# Patient Record
Sex: MALE | Race: WHITE | NOT HISPANIC OR LATINO | Employment: UNEMPLOYED | URBAN - METROPOLITAN AREA
[De-identification: names, ages, dates, MRNs, and addresses within clinical notes are randomized per-mention and may not be internally consistent; named-entity substitution may affect disease eponyms.]

---

## 2017-11-21 ENCOUNTER — OFFICE VISIT (OUTPATIENT)
Dept: URGENT CARE | Facility: CLINIC | Age: 32
End: 2017-11-21
Payer: MEDICAID

## 2017-11-21 VITALS
RESPIRATION RATE: 18 BRPM | TEMPERATURE: 98 F | OXYGEN SATURATION: 98 % | HEIGHT: 60 IN | BODY MASS INDEX: 29.45 KG/M2 | DIASTOLIC BLOOD PRESSURE: 78 MMHG | SYSTOLIC BLOOD PRESSURE: 116 MMHG | HEART RATE: 78 BPM | WEIGHT: 150 LBS

## 2017-11-21 DIAGNOSIS — H60.331 ACUTE SWIMMER'S EAR OF RIGHT SIDE: ICD-10-CM

## 2017-11-21 DIAGNOSIS — H66.001 ACUTE SUPPURATIVE OTITIS MEDIA OF RIGHT EAR WITHOUT SPONTANEOUS RUPTURE OF TYMPANIC MEMBRANE, RECURRENCE NOT SPECIFIED: Primary | ICD-10-CM

## 2017-11-21 DIAGNOSIS — L30.9 DERMATITIS: ICD-10-CM

## 2017-11-21 PROCEDURE — 99203 OFFICE O/P NEW LOW 30 MIN: CPT | Mod: S$GLB,,, | Performed by: PHYSICIAN ASSISTANT

## 2017-11-21 RX ORDER — AMOXICILLIN AND CLAVULANATE POTASSIUM 875; 125 MG/1; MG/1
1 TABLET, FILM COATED ORAL 2 TIMES DAILY
Qty: 20 TABLET | Refills: 0 | Status: SHIPPED | OUTPATIENT
Start: 2017-11-21 | End: 2017-12-01

## 2017-11-21 RX ORDER — NEOMYCIN SULFATE, POLYMYXIN B SULFATE, HYDROCORTISONE 3.5; 10000; 1 MG/ML; [USP'U]/ML; MG/ML
3 SOLUTION/ DROPS AURICULAR (OTIC) 3 TIMES DAILY
Qty: 10 ML | Refills: 0 | Status: SHIPPED | OUTPATIENT
Start: 2017-11-21 | End: 2017-12-01

## 2017-11-21 RX ORDER — CODEINE PHOSPHATE AND GUAIFENESIN 10; 100 MG/5ML; MG/5ML
5 SOLUTION ORAL 3 TIMES DAILY PRN
Qty: 120 ML | Refills: 0 | Status: SHIPPED | OUTPATIENT
Start: 2017-11-21 | End: 2017-12-01

## 2017-11-21 RX ORDER — GUAIFENESIN 600 MG/1
1200 TABLET, EXTENDED RELEASE ORAL 2 TIMES DAILY
Qty: 40 TABLET | Refills: 0 | COMMUNITY
Start: 2017-11-21 | End: 2017-11-21 | Stop reason: CLARIF

## 2017-11-21 RX ORDER — TRIAMCINOLONE ACETONIDE 1 MG/G
CREAM TOPICAL 2 TIMES DAILY
Qty: 80 G | Refills: 0 | Status: SHIPPED | OUTPATIENT
Start: 2017-11-21 | End: 2019-01-05

## 2017-11-21 NOTE — PATIENT INSTRUCTIONS
Nonspecific Dermatitis  Dermatitis is a skin rash caused by something that touches the skin and makes it irritated and inflamed.  Your skin may be red, swollen, dry, and may be cracked. Blisters may form and ooze. The rash will itch.  Dermatitis can form on the face and neck, backs of hands, forearms, genitals, and lower legs. Dermatitis is not passed from person to person.  Talk with your health care provider about what may have caused the rash. Common things that cause skin allergies are metal in jewelry, plants like poison ivy or poison oak, and certain skin care products. You will need to avoid the source of your rash in the future to prevent it from coming back. In some cases, the cause of the dermatitis may not be found.  Treatment is done to relieve itching and prevent the rash from coming back. The rash should go away in a few days to a few weeks.  Home care  The health care provider may prescribe medications to relieve swelling and itching. Follow all instructions when using these medications.  · Avoid anything that heats up your skin, such as hot showers or baths, or direct sunlight. This can make itching worse.  · Stay away from whatever you think caused the rash.  · Bathe in warm, not hot, water. Apply a moisturizing lotion after bathing to prevent dry skin.  · Avoid skin irritants such as wool or silk clothing, grease, oils, harsh soaps, and detergents.  · Apply cold compresses to soothe your sores to help relieve your symptoms. Do this for 30 minutes 3 to 4 times a day. You can make a cold compress by soaking a cloth in cold water. Squeeze out excess water. You can add colloidal oatmeal to the water to help reduce itching. For severe itching in a small area, apply an ice pack wrapped in a thin towel. Do this for 20 minutes 3 to 4 times a day.  · You can also help relieve large areas of itching by taking a lukewarm bath with colloidal oatmeal added to the water.  · Use hydrocortisone cream for redness  and irritation, unless another medicine was prescribed. You can also use benzocaine anesthetic cream or spray.  · Use oral diphenhydramine to help reduce itching. This is an antihistamine you can buy at drug and grocery stores. It can make you sleepy, so use lower doses during the daytime. Or you can use loratadine. This is an antihistamine that will not make you sleepy. Dont use diphenhydramine if you have glaucoma or have trouble urinating because of an enlarged prostate.  · Wash your hands or use an antibacterial gel often to prevent the spread of the rash.  Follow-up care  Follow up with your health care provider. Make an appointment with your health care provider if your symptoms do not get better in the next 1 to 2 weeks.  When to seek medical advice  Call your health care provider right away if any of these occur:  · Spreading of the rash to other parts of your body  · Severe swelling of your face, eyelids, mouth, throat or tongue  · Trouble urinating due to swelling in the genital area  · Fever of 100.4°F (38°C) or higher  · Redness or swelling that gets worse  · Pain that gets worse  · Foul-smelling fluid leaking from the skin  · Yellow-brown crusts on the open blisters  · Joint pain   Date Last Reviewed: 7/23/2014  © 9615-5744 [employer]. [address] [place] [address], [place] [address]. All rights reserved. This information is not intended as a substitute for professional medical care. Always follow your healthcare professional's instructions.        Middle Ear Infection (Adult)  You have an infection of the middle ear, the space behind the eardrum. This is also called acute otitis media (AOM). Sometimes it is caused by the common cold. This is because congestion can block the internal passage (eustachian tube) that drains fluid from the middle ear. When the middle ear fills with fluid, bacteria can grow there and cause an infection. Oral antibiotics are used to treat this illness, not ear drops.  Symptoms usually start to improve within 1 to 2 days of treatment.    Home care  The following are general care guidelines:  · Finish all of the antibiotic medicine given, even though you may feel better after the first few days.  · You may use over-the-counter medicine, such as acetaminophen or ibuprofen, to control pain and fever, unless something else was prescribed. If you have chronic liver or kidney disease or have ever had a stomach ulcer or gastrointestinal bleeding, talk with your healthcare provider before using these medicines. Do not give aspirin to anyone under 18 years of age who has a fever. It may cause severe illness or death.  Follow-up care  Follow up with your healthcare provider, or as advised, in 2 weeks if all symptoms have not gotten better, or if hearing doesn't go back to normal within 1 month.  When to seek medical advice  Call your healthcare provider right away if any of these occur:  · Ear pain gets worse or does not improve after 3 days of treatment  · Unusual drowsiness or confusion  · Neck pain, stiff neck, or headache  · Fluid or blood draining from the ear canal  · Fever of 100.4°F (38°C) or as advised   · Seizure  Date Last Reviewed: 6/1/2016  © 8092-4789 [employer]. [address] [place] [address]. All rights reserved. This information is not intended as a substitute for professional medical care. Always follow your healthcare professional's instructions.      Please follow up with your Primary care provider within 2-5 days if your signs and symptoms have not resolved or worsen.     If your condition worsens or fails to improve we recommend that you receive another evaluation at the emergency room immediately or contact your primary medical clinic to discuss your concerns.   You must understand that you have received an Urgent Care treatment only and that you may be released before all of your medical problems are known or treated. You, the patient, will  arrange for follow up care as instructed.

## 2017-11-21 NOTE — PROGRESS NOTES
Subjective:       Patient ID: Kwabena Gallagher IV is a 32 y.o. male.    Vitals:  height is 5' (1.524 m) and weight is 68 kg (150 lb). His tympanic temperature is 97.8 °F (36.6 °C). His blood pressure is 116/78 and his pulse is 78. His respiration is 18 and oxygen saturation is 98%.     Chief Complaint: Otitis Media (bilat) and Rash (left hip )    Present with bilat recurrent ear infections that causes jaw pain and rash on left hip for 1 + year.        Otitis Media:   Chronicity:  Recurrent  Onset:  1 to 4 weeks ago (3-4 weeks )  Progression since onset:  Unchanged  Frequency:  Constantly  Number of Infections:  2 - 5 (2 in 20 years )  Pain scale:  7/10  Severity:  Moderate  Duration:  Very brief  Otitis media characteristics: radiates to jaws    Associated symptoms: ear pain (bilat mostly right ) and rash.  No chills, no fever, no headaches, no congestion, no shortness of breath, no sore throat and no wheezing.  Aggravated by:  Nothing  Treatments tried: excedrine migraine.  Improvement on treatment:  Significant   PMH includes: ear surgery.    Rash   Pertinent negatives include no congestion, cough, fever, joint pain, shortness of breath or sore throat.     Review of Systems   Constitution: Negative for chills, fever and malaise/fatigue.   HENT: Positive for ear pain (bilat mostly right ). Negative for congestion, hoarse voice and sore throat.    Eyes: Negative for discharge and redness.   Cardiovascular: Negative for chest pain, dyspnea on exertion and leg swelling.   Respiratory: Negative for cough, shortness of breath, sputum production and wheezing.    Skin: Positive for color change, itching and rash.   Musculoskeletal: Negative for joint pain and myalgias.   Gastrointestinal: Negative for abdominal pain and nausea.   Neurological: Negative for headaches.       Objective:      Physical Exam   Constitutional: He is oriented to person, place, and time. He appears well-developed and well-nourished. He is  cooperative.  Non-toxic appearance. He does not appear ill. No distress.   HENT:   Head: Normocephalic and atraumatic. Head is without abrasion, without contusion and without laceration.   Right Ear: Hearing, external ear and ear canal normal. There is drainage, swelling and tenderness. Tympanic membrane is injected. A middle ear effusion is present.   Left Ear: Hearing, tympanic membrane, external ear and ear canal normal.   Nose: Nose normal. No mucosal edema, rhinorrhea or nasal deformity. No epistaxis. Right sinus exhibits no maxillary sinus tenderness and no frontal sinus tenderness. Left sinus exhibits no maxillary sinus tenderness and no frontal sinus tenderness.   Mouth/Throat: Uvula is midline, oropharynx is clear and moist and mucous membranes are normal. No trismus in the jaw. Normal dentition. No uvula swelling. No posterior oropharyngeal erythema.   Eyes: Conjunctivae, EOM and lids are normal. Pupils are equal, round, and reactive to light. No scleral icterus.   Sclera clear bilat   Neck: Trachea normal, full passive range of motion without pain and phonation normal. Neck supple.   Cardiovascular: Normal rate, regular rhythm, normal heart sounds, intact distal pulses and normal pulses.    Pulmonary/Chest: Effort normal and breath sounds normal. No stridor. No respiratory distress.   Abdominal: Soft. Normal appearance and bowel sounds are normal. He exhibits no distension. There is no tenderness.   Musculoskeletal: Normal range of motion. He exhibits no edema or deformity.   Neurological: He is alert and oriented to person, place, and time. He exhibits normal muscle tone. Coordination normal.   Skin: Skin is warm, dry and intact. Capillary refill takes less than 2 seconds. Rash noted. No abrasion, no bruising, no burn, no ecchymosis, no laceration, no lesion and no purpura noted. Rash is macular. Rash is not papular, not maculopapular, not nodular, not pustular, not vesicular and not urticarial. He is  not diaphoretic. No erythema. No pallor.        Psychiatric: He has a normal mood and affect. His speech is normal and behavior is normal. Judgment and thought content normal. Cognition and memory are normal.   Nursing note and vitals reviewed.      Assessment:       1. Acute suppurative otitis media of right ear without spontaneous rupture of tympanic membrane, recurrence not specified    2. Acute swimmer's ear of right side    3. Dermatitis        Plan:         Acute suppurative otitis media of right ear without spontaneous rupture of tympanic membrane, recurrence not specified  -     guaiFENesin (MUCINEX) 600 mg 12 hr tablet; Take 2 tablets (1,200 mg total) by mouth 2 (two) times daily.  Dispense: 40 tablet; Refill: 0  -     amoxicillin-clavulanate 875-125mg (AUGMENTIN) 875-125 mg per tablet; Take 1 tablet by mouth 2 (two) times daily.  Dispense: 20 tablet; Refill: 0    Acute swimmer's ear of right side  -     neomycin-polymyxin-hydrocortisone (CORTISPORIN) otic solution; Place 3 drops into the right ear 3 (three) times daily.  Dispense: 10 mL; Refill: 0    Dermatitis  -     triamcinolone acetonide 0.1% (KENALOG) 0.1 % cream; Apply topically 2 (two) times daily.  Dispense: 80 g; Refill: 0        Nonspecific Dermatitis  Dermatitis is a skin rash caused by something that touches the skin and makes it irritated and inflamed.  Your skin may be red, swollen, dry, and may be cracked. Blisters may form and ooze. The rash will itch.  Dermatitis can form on the face and neck, backs of hands, forearms, genitals, and lower legs. Dermatitis is not passed from person to person.  Talk with your health care provider about what may have caused the rash. Common things that cause skin allergies are metal in jewelry, plants like poison ivy or poison oak, and certain skin care products. You will need to avoid the source of your rash in the future to prevent it from coming back. In some cases, the cause of the dermatitis may not be  found.  Treatment is done to relieve itching and prevent the rash from coming back. The rash should go away in a few days to a few weeks.  Home care  The health care provider may prescribe medications to relieve swelling and itching. Follow all instructions when using these medications.  · Avoid anything that heats up your skin, such as hot showers or baths, or direct sunlight. This can make itching worse.  · Stay away from whatever you think caused the rash.  · Bathe in warm, not hot, water. Apply a moisturizing lotion after bathing to prevent dry skin.  · Avoid skin irritants such as wool or silk clothing, grease, oils, harsh soaps, and detergents.  · Apply cold compresses to soothe your sores to help relieve your symptoms. Do this for 30 minutes 3 to 4 times a day. You can make a cold compress by soaking a cloth in cold water. Squeeze out excess water. You can add colloidal oatmeal to the water to help reduce itching. For severe itching in a small area, apply an ice pack wrapped in a thin towel. Do this for 20 minutes 3 to 4 times a day.  · You can also help relieve large areas of itching by taking a lukewarm bath with colloidal oatmeal added to the water.  · Use hydrocortisone cream for redness and irritation, unless another medicine was prescribed. You can also use benzocaine anesthetic cream or spray.  · Use oral diphenhydramine to help reduce itching. This is an antihistamine you can buy at drug and grocery stores. It can make you sleepy, so use lower doses during the daytime. Or you can use loratadine. This is an antihistamine that will not make you sleepy. Dont use diphenhydramine if you have glaucoma or have trouble urinating because of an enlarged prostate.  · Wash your hands or use an antibacterial gel often to prevent the spread of the rash.  Follow-up care  Follow up with your health care provider. Make an appointment with your health care provider if your symptoms do not get better in the next 1 to 2  weeks.  When to seek medical advice  Call your health care provider right away if any of these occur:  · Spreading of the rash to other parts of your body  · Severe swelling of your face, eyelids, mouth, throat or tongue  · Trouble urinating due to swelling in the genital area  · Fever of 100.4°F (38°C) or higher  · Redness or swelling that gets worse  · Pain that gets worse  · Foul-smelling fluid leaking from the skin  · Yellow-brown crusts on the open blisters  · Joint pain   Date Last Reviewed: 7/23/2014 © 2000-2017 [employer]. [address] [place] [address]. All rights reserved. This information is not intended as a substitute for professional medical care. Always follow your healthcare professional's instructions.        Middle Ear Infection (Adult)  You have an infection of the middle ear, the space behind the eardrum. This is also called acute otitis media (AOM). Sometimes it is caused by the common cold. This is because congestion can block the internal passage (eustachian tube) that drains fluid from the middle ear. When the middle ear fills with fluid, bacteria can grow there and cause an infection. Oral antibiotics are used to treat this illness, not ear drops. Symptoms usually start to improve within 1 to 2 days of treatment.    Home care  The following are general care guidelines:  · Finish all of the antibiotic medicine given, even though you may feel better after the first few days.  · You may use over-the-counter medicine, such as acetaminophen or ibuprofen, to control pain and fever, unless something else was prescribed. If you have chronic liver or kidney disease or have ever had a stomach ulcer or gastrointestinal bleeding, talk with your healthcare provider before using these medicines. Do not give aspirin to anyone under 18 years of age who has a fever. It may cause severe illness or death.  Follow-up care  Follow up with your healthcare provider, or as advised, in 2  weeks if all symptoms have not gotten better, or if hearing doesn't go back to normal within 1 month.  When to seek medical advice  Call your healthcare provider right away if any of these occur:  · Ear pain gets worse or does not improve after 3 days of treatment  · Unusual drowsiness or confusion  · Neck pain, stiff neck, or headache  · Fluid or blood draining from the ear canal  · Fever of 100.4°F (38°C) or as advised   · Seizure  Date Last Reviewed: 6/1/2016 © 2000-2017 [employer]. [address] [place] [address]. All rights reserved. This information is not intended as a substitute for professional medical care. Always follow your healthcare professional's instructions.      Please follow up with your Primary care provider within 2-5 days if your signs and symptoms have not resolved or worsen.     If your condition worsens or fails to improve we recommend that you receive another evaluation at the emergency room immediately or contact your primary medical clinic to discuss your concerns.   You must understand that you have received an Urgent Care treatment only and that you may be released before all of your medical problems are known or treated. You, the patient, will arrange for follow up care as instructed.

## 2019-01-05 ENCOUNTER — HOSPITAL ENCOUNTER (EMERGENCY)
Facility: HOSPITAL | Age: 34
Discharge: HOME | End: 2019-01-06
Attending: EMERGENCY MEDICINE
Payer: MEDICAID

## 2019-01-05 DIAGNOSIS — J32.9 BACTERIAL SINUSITIS: Primary | ICD-10-CM

## 2019-01-05 DIAGNOSIS — B96.89 BACTERIAL SINUSITIS: Primary | ICD-10-CM

## 2019-01-05 DIAGNOSIS — R42 LIGHTHEADEDNESS: ICD-10-CM

## 2019-01-05 DIAGNOSIS — R73.9 HYPERGLYCEMIA: ICD-10-CM

## 2019-01-05 DIAGNOSIS — R05.9 COUGH: ICD-10-CM

## 2019-01-05 LAB
ALBUMIN SERPL BCP-MCNC: 3.6 G/DL
ALLENS TEST: ABNORMAL
ALP SERPL-CCNC: 103 U/L
ALT SERPL W/O P-5'-P-CCNC: 60 U/L
ANION GAP SERPL CALC-SCNC: 8 MMOL/L
AST SERPL-CCNC: 33 U/L
B-OH-BUTYR BLD STRIP-SCNC: 0.1 MMOL/L
BACTERIA #/AREA URNS AUTO: NORMAL /HPF
BASOPHILS # BLD AUTO: 0.06 K/UL
BASOPHILS NFR BLD: 0.9 %
BILIRUB SERPL-MCNC: 0.3 MG/DL
BILIRUB UR QL STRIP: NEGATIVE
BUN SERPL-MCNC: 11 MG/DL
CALCIUM SERPL-MCNC: 9.5 MG/DL
CHLORIDE SERPL-SCNC: 104 MMOL/L
CLARITY UR REFRACT.AUTO: CLEAR
CO2 SERPL-SCNC: 26 MMOL/L
COLOR UR AUTO: ABNORMAL
CREAT SERPL-MCNC: 0.9 MG/DL
CTP QC/QA: YES
DELSYS: ABNORMAL
DIFFERENTIAL METHOD: ABNORMAL
EOSINOPHIL # BLD AUTO: 0.3 K/UL
EOSINOPHIL NFR BLD: 4.5 %
ERYTHROCYTE [DISTWIDTH] IN BLOOD BY AUTOMATED COUNT: 12.6 %
EST. GFR  (AFRICAN AMERICAN): >60 ML/MIN/1.73 M^2
EST. GFR  (NON AFRICAN AMERICAN): >60 ML/MIN/1.73 M^2
GLUCOSE SERPL-MCNC: 215 MG/DL
GLUCOSE UR QL STRIP: ABNORMAL
HCO3 UR-SCNC: 26.3 MMOL/L (ref 24–28)
HCT VFR BLD AUTO: 45.6 %
HGB BLD-MCNC: 15.2 G/DL
HGB UR QL STRIP: NEGATIVE
IMM GRANULOCYTES # BLD AUTO: 0.04 K/UL
IMM GRANULOCYTES NFR BLD AUTO: 0.6 %
KETONES UR QL STRIP: NEGATIVE
LACTATE SERPL-SCNC: 0.7 MMOL/L
LEUKOCYTE ESTERASE UR QL STRIP: NEGATIVE
LYMPHOCYTES # BLD AUTO: 1.7 K/UL
LYMPHOCYTES NFR BLD: 26.2 %
MCH RBC QN AUTO: 29.6 PG
MCHC RBC AUTO-ENTMCNC: 33.3 G/DL
MCV RBC AUTO: 89 FL
MICROSCOPIC COMMENT: NORMAL
MODE: ABNORMAL
MONOCYTES # BLD AUTO: 1.3 K/UL
MONOCYTES NFR BLD: 19.9 %
NEUTROPHILS # BLD AUTO: 3.1 K/UL
NEUTROPHILS NFR BLD: 47.9 %
NITRITE UR QL STRIP: NEGATIVE
NRBC BLD-RTO: 0 /100 WBC
PCO2 BLDA: 46.2 MMHG (ref 35–45)
PH SMN: 7.36 [PH] (ref 7.35–7.45)
PH UR STRIP: 6 [PH] (ref 5–8)
PLATELET # BLD AUTO: 197 K/UL
PMV BLD AUTO: 10.4 FL
PO2 BLDA: 30 MMHG (ref 40–60)
POC BE: 1 MMOL/L
POC MOLECULAR INFLUENZA A AGN: NEGATIVE
POC MOLECULAR INFLUENZA B AGN: NEGATIVE
POC SATURATED O2: 55 % (ref 95–100)
POC TCO2: 28 MMOL/L (ref 24–29)
POCT GLUCOSE: 257 MG/DL (ref 70–110)
POTASSIUM SERPL-SCNC: 4 MMOL/L
PROT SERPL-MCNC: 7.1 G/DL
PROT UR QL STRIP: NEGATIVE
RBC # BLD AUTO: 5.14 M/UL
RBC #/AREA URNS AUTO: 1 /HPF (ref 0–4)
SAMPLE: ABNORMAL
SITE: ABNORMAL
SODIUM SERPL-SCNC: 138 MMOL/L
SP GR UR STRIP: 1 (ref 1–1.03)
SQUAMOUS #/AREA URNS AUTO: 0 /HPF
TROPONIN I SERPL DL<=0.01 NG/ML-MCNC: <0.006 NG/ML
URN SPEC COLLECT METH UR: ABNORMAL
WBC # BLD AUTO: 6.44 K/UL
WBC #/AREA URNS AUTO: 1 /HPF (ref 0–5)
YEAST UR QL AUTO: NORMAL

## 2019-01-05 PROCEDURE — 96374 THER/PROPH/DIAG INJ IV PUSH: CPT

## 2019-01-05 PROCEDURE — 84484 ASSAY OF TROPONIN QUANT: CPT

## 2019-01-05 PROCEDURE — 96361 HYDRATE IV INFUSION ADD-ON: CPT

## 2019-01-05 PROCEDURE — 99900035 HC TECH TIME PER 15 MIN (STAT)

## 2019-01-05 PROCEDURE — 93005 ELECTROCARDIOGRAM TRACING: CPT

## 2019-01-05 PROCEDURE — 25000003 PHARM REV CODE 250: Performed by: PHYSICIAN ASSISTANT

## 2019-01-05 PROCEDURE — 99284 PR EMERGENCY DEPT VISIT,LEVEL IV: ICD-10-PCS | Mod: ,,, | Performed by: PHYSICIAN ASSISTANT

## 2019-01-05 PROCEDURE — 82010 KETONE BODYS QUAN: CPT

## 2019-01-05 PROCEDURE — 82803 BLOOD GASES ANY COMBINATION: CPT

## 2019-01-05 PROCEDURE — 99284 EMERGENCY DEPT VISIT MOD MDM: CPT | Mod: 25

## 2019-01-05 PROCEDURE — 93010 EKG 12-LEAD: ICD-10-PCS | Mod: ,,, | Performed by: INTERNAL MEDICINE

## 2019-01-05 PROCEDURE — 99284 EMERGENCY DEPT VISIT MOD MDM: CPT | Mod: ,,, | Performed by: PHYSICIAN ASSISTANT

## 2019-01-05 PROCEDURE — 85025 COMPLETE CBC W/AUTO DIFF WBC: CPT

## 2019-01-05 PROCEDURE — 93010 ELECTROCARDIOGRAM REPORT: CPT | Mod: ,,, | Performed by: INTERNAL MEDICINE

## 2019-01-05 PROCEDURE — 83605 ASSAY OF LACTIC ACID: CPT

## 2019-01-05 PROCEDURE — 80053 COMPREHEN METABOLIC PANEL: CPT

## 2019-01-05 PROCEDURE — 63600175 PHARM REV CODE 636 W HCPCS: Performed by: PHYSICIAN ASSISTANT

## 2019-01-05 PROCEDURE — 81001 URINALYSIS AUTO W/SCOPE: CPT

## 2019-01-05 PROCEDURE — 82962 GLUCOSE BLOOD TEST: CPT

## 2019-01-05 RX ORDER — INSULIN ASPART 100 [IU]/ML
INJECTION, SOLUTION INTRAVENOUS; SUBCUTANEOUS
COMMUNITY
End: 2022-08-10

## 2019-01-05 RX ORDER — ONDANSETRON 2 MG/ML
4 INJECTION INTRAMUSCULAR; INTRAVENOUS
Status: COMPLETED | OUTPATIENT
Start: 2019-01-05 | End: 2019-01-05

## 2019-01-05 RX ADMIN — ONDANSETRON 4 MG: 2 INJECTION INTRAMUSCULAR; INTRAVENOUS at 09:01

## 2019-01-05 RX ADMIN — SODIUM CHLORIDE 1000 ML: 0.9 INJECTION, SOLUTION INTRAVENOUS at 09:01

## 2019-01-05 RX ADMIN — SODIUM CHLORIDE 1000 ML: 0.9 INJECTION, SOLUTION INTRAVENOUS at 11:01

## 2019-01-06 VITALS
SYSTOLIC BLOOD PRESSURE: 125 MMHG | HEART RATE: 78 BPM | TEMPERATURE: 98 F | WEIGHT: 150 LBS | BODY MASS INDEX: 20.32 KG/M2 | DIASTOLIC BLOOD PRESSURE: 86 MMHG | RESPIRATION RATE: 18 BRPM | OXYGEN SATURATION: 99 % | HEIGHT: 72 IN

## 2019-01-06 LAB — POCT GLUCOSE: 75 MG/DL (ref 70–110)

## 2019-01-06 RX ORDER — AZITHROMYCIN 500 MG/1
500 TABLET, FILM COATED ORAL DAILY
Qty: 5 TABLET | Refills: 0 | Status: SHIPPED | OUTPATIENT
Start: 2019-01-06 | End: 2019-01-11

## 2019-01-06 NOTE — ED NOTES
Patient stated to nurse that earlier he had a brief period of dizziness that resolved.  Nurse encouraged patient to use call light for assistance with ambulation and to alert nurse if these dizzy spells occur with sudden movement.

## 2019-01-06 NOTE — ED TRIAGE NOTES
Pt reports feeling sick for 5-6 days and yesterday he started to feel dizzy. Pt states his BS at home was elevated 268. Pt has insulin pump. Denies dysuria, chestpain, n/v      LOC: The patient is awake, alert, aware of environment with an appropriate affect. Oriented x4, speaking appropriately  APPEARANCE: Pt resting comfortably, in no acute distress, pt is clean and well groomed, clothing properly fastened  SKIN:The skin is warm and dry, color consistent with ethnicity, patient has normal skin turgor and dry mucus membranes  RESPIRATORY:Airway is open and patent, respirations are spontaneous, patient has a normal effort and rate, no accessory muscle use noted.  CARDIAC: Normal rate and rhythm, no peripheral edema noted, capillary refill < 3 seconds, bilateral radial pulses 2+.  ABDOMEN: Soft, non tender, non distended.   NEUROLOGIC: PERRLA, facial expression is symmetrical, patient moving all extremities spontaneously, normal sensation in all extremities when touched with a finger.  Follows all commands appropriately  MUSCULOSKELETAL: Patient moving all extremities spontaneously, no obvious swelling or deformities noted.

## 2019-01-06 NOTE — ED PROVIDER NOTES
"Encounter Date: 1/5/2019    SCRIBE #1 NOTE: I, Jannie Pinedo, am scribing for, and in the presence of,  Dr. Montano. I have scribed the following portions of the note - the APC attestation.     I, Dr. Nolberto Montano, personally performed the services described in this documentation. All medical record entries made by the scribe were at my direction and in my presence.  I have reviewed the chart and agree that the record reflects my personal performance and is accurate and complete.  Nolberto Montano MD.  12:29 AM 01/06/2019    History     Chief Complaint   Patient presents with    Hyperglycemia     pt states that he has not been feeling well for 6 days. pt states that his sugar was 258 x 4 hours ago. pt complains of lightheadness and dizziness. pt reading in triage 257.      33 year old male with medical history of T1DM presenting to the ED with the chief complaint of hyperglycemia. Patient reports being in [facility] until 6 days ago. He reports having sore throat, hoarseness, dry cough, dizziness, chest tightness, SOB. Patient describes the dizziness as feeling lightheaded and denies the sensation of the room spinning around him. He reports having polydipsia and mental "fogginess". He denies abdominal pain, vomiting, dysuria, hematuria, diarrhea, constipation. Patient has an insulin pump in place that has been present for 2-3 years. He denies recent complications. Patient is followed by Dr. Holm (Endocrinology) in [place]. Patient reports a previous ICU admission for DKA 7 years ago.           Review of patient's allergies indicates:  No Known Allergies  Past Medical History:   Diagnosis Date    Diabetes mellitus type I      History reviewed. No pertinent surgical history.  History reviewed. No pertinent family history.  Social History     Tobacco Use    Smoking status: Former Smoker     Types: Cigarettes    Smokeless tobacco: Former User   Substance Use Topics    Alcohol use: Yes     Alcohol/week: 12.6 oz     Types: 21 " Shots of liquor per week    Drug use: No     Review of Systems   Constitutional: Negative for chills and fever.   HENT: Positive for congestion, sinus pressure, sinus pain and sore throat. Negative for trouble swallowing.    Respiratory: Positive for cough, chest tightness and shortness of breath.    Cardiovascular: Negative for chest pain.   Gastrointestinal: Positive for nausea. Negative for abdominal pain, diarrhea and vomiting.   Endocrine: Positive for polydipsia. Negative for polyuria.   Genitourinary: Negative for dysuria, flank pain and hematuria.   Musculoskeletal: Negative for back pain, neck pain and neck stiffness.   Skin: Negative for wound.   Neurological: Positive for dizziness, weakness and light-headedness.     Physical Exam     Initial Vitals [01/05/19 2042]   BP Pulse Resp Temp SpO2   135/87 99 20 97.6 °F (36.4 °C) 99 %      MAP       --         Physical Exam    Constitutional: He appears well-developed and well-nourished. He is not diaphoretic. No distress.   Thin appearing   HENT:   Head: Normocephalic and atraumatic.   Mouth/Throat: Oropharynx is clear and moist. No oropharyngeal exudate.   Tenderness over bilateral maxillary sinuses   Eyes: EOM are normal. Pupils are equal, round, and reactive to light.   Neck: Normal range of motion. Neck supple.   Cardiovascular: Normal rate and intact distal pulses.   Pulmonary/Chest: Breath sounds normal. No respiratory distress. He has no wheezes.   Abdominal: Soft. There is no tenderness.   Musculoskeletal: Normal range of motion. He exhibits no edema or tenderness.   Neurological: He is alert and oriented to person, place, and time.   Skin: Skin is warm and dry. No erythema.       ED Course   Procedures  Labs Reviewed   CBC W/ AUTO DIFFERENTIAL - Abnormal; Notable for the following components:       Result Value    Immature Granulocytes 0.6 (*)     Mono # 1.3 (*)     Mono% 19.9 (*)     All other components within normal limits   COMPREHENSIVE METABOLIC  PANEL - Abnormal; Notable for the following components:    Glucose 215 (*)     ALT 60 (*)     All other components within normal limits   URINALYSIS, REFLEX TO URINE CULTURE - Abnormal; Notable for the following components:    Glucose, UA 3+ (*)     All other components within normal limits    Narrative:     Preferred Collection Type->Urine, Clean Catch  orange cup   POCT GLUCOSE - Abnormal; Notable for the following components:    POCT Glucose 257 (*)     All other components within normal limits   ISTAT PROCEDURE - Abnormal; Notable for the following components:    POC PCO2 46.2 (*)     POC PO2 30 (*)     POC SATURATED O2 55 (*)     All other components within normal limits   BETA - HYDROXYBUTYRATE, SERUM   LACTIC ACID, PLASMA   TROPONIN I   URINALYSIS MICROSCOPIC    Narrative:     Preferred Collection Type->Urine, Clean Catch  orange cup   POCT INFLUENZA A/B MOLECULAR   POCT GLUCOSE          Imaging Results          X-Ray Chest PA And Lateral (Final result)  Result time 01/05/19 23:42:20    Final result by Leisa Knox MD (01/05/19 23:42:20)                 Impression:      No radiographic evidence of acute intra-thoracic process.      Electronically signed by: Leisa Knox MD  Date:    01/05/2019  Time:    23:42             Narrative:    EXAMINATION:  XR CHEST PA AND LATERAL    CLINICAL HISTORY:  Cough    TECHNIQUE:  PA and lateral views of the chest were performed.    COMPARISON:  None    FINDINGS:  Cardiac monitoring leads overlie the chest.  The cardiomediastinal silhouette is within normal limits. The visualized airway is unremarkable.  The lungs appear symmetrically aerated without definite focal alveolar consolidation. No large pleural effusion or pneumothorax is appreciated.Visualized osseous structures demonstrate no acute abnormalities.                                 Medical Decision Making:   History:   Old Medical Records: I decided to obtain old medical records.  Clinical Tests:   Lab Tests:  Ordered and Reviewed  Radiological Study: Ordered and Reviewed  Medical Tests: Ordered and Reviewed  ED Management:  I personally evaluated the patient and agree with aforementioned HPI, review of systems, physical exam.  After the patient received fluids he stated he was feeling better and was able to appropriately tolerate p.o. intake.  Patient is being discharged home with instructions to follow up as needed with his primary care physician/Clinic and follow up with his endocrinologist as needed further assessment evaluation of his insulin pump.  At this time there does not appear to be any acute Endocrinology pathology occurring.       APC / Resident Notes:   33 year old male with medical history of T1DM presenting to the ED c/o hyperglycemia. DDx includes but not limited to viral syndrome, DKA, HHS, electrolyte disturbance, sinusitis, pharyngitis, pneumonia, dehydration, medication side effect. Will get DKA work-up. Will give fluids and anti-emetics.     Work-up shows initial  on arrival. VBG pH 7.3, WBC 6.4, H/H 15/45, Crt 0.9, Trop <0.006, B-HB 0.1, Lactate 0.7. UA +3 sugar, negative ketones, negative occult blood, negative UTI. CXR without acute intrathoracic process.     Do not suspect DKA or HHS at this time. Repeat CBG improved to 75 after fluids. Patient stable on reassessment and able to ambulate in the ED without difficulty. Patient stable for outpatient management for DM with his Endocrinologist. Will treat for acute sinusitis with Z-pack. Discussed plan with patient and he is agreeable. Return to ED precautions given for new, worsening, or concerning symptoms. I have discussed the care of this patient with my supervising physician.         Scribe Attestation:   Scribe #1: I performed the above scribed service and the documentation accurately describes the services I performed. I attest to the accuracy of the note.    Attending Attestation:     Physician Attestation Statement for NP/PA:   I  discussed this assessment and plan of this patient with the NP/PA, but I did not personally examine the patient. The face to face encounter was performed by the NP/PA.    Other NP/PA Attestation Additions:    History of Present Illness: I am in agreement with my physician assistant's assessment, treatment, and plan of care.                      Clinical Impression:   The primary encounter diagnosis was Bacterial sinusitis. Diagnoses of Hyperglycemia, Cough, and Lightheadedness were also pertinent to this visit.      Disposition:   Disposition: Discharged  Condition: Stable                        Steve Beltran PA-C  01/06/19 0626

## 2019-01-06 NOTE — DISCHARGE INSTRUCTIONS
Please take the prescribed Azithromycin for your sinus infection. You may also use over-the-counter Flonase to treat your sinus symptoms. Please hydrate by drinking plenty of water.   Please call and follow-up with your Endocrinologist for evaluation of your insulin regimen.    Please return to the ED for new, worsening, or concerning symptoms.

## 2019-01-06 NOTE — ED TRIAGE NOTES
Pt stated he had upper respiratory symptoms for 6 days. Coughing non productive, SOB, congestin, dizziness.

## 2019-01-28 ENCOUNTER — OFFICE VISIT (OUTPATIENT)
Dept: URGENT CARE | Facility: CLINIC | Age: 34
End: 2019-01-28
Payer: MEDICAID

## 2019-01-28 VITALS
HEART RATE: 94 BPM | TEMPERATURE: 98 F | SYSTOLIC BLOOD PRESSURE: 129 MMHG | OXYGEN SATURATION: 98 % | DIASTOLIC BLOOD PRESSURE: 84 MMHG | HEIGHT: 72 IN | WEIGHT: 150 LBS | BODY MASS INDEX: 20.32 KG/M2

## 2019-01-28 DIAGNOSIS — R55 POSTURAL DIZZINESS WITH NEAR SYNCOPE: Primary | ICD-10-CM

## 2019-01-28 DIAGNOSIS — R42 POSTURAL DIZZINESS WITH NEAR SYNCOPE: Primary | ICD-10-CM

## 2019-01-28 PROCEDURE — 99214 PR OFFICE/OUTPT VISIT, EST, LEVL IV, 30-39 MIN: ICD-10-PCS | Mod: S$GLB,,, | Performed by: SURGERY

## 2019-01-28 PROCEDURE — 99214 OFFICE O/P EST MOD 30 MIN: CPT | Mod: S$GLB,,, | Performed by: SURGERY

## 2019-01-28 NOTE — LETTER
January 28, 2019      [facility] [address], [address] [address]  Phone: [phone]  Fax: [phone]       Patient: Kwabena Gallagher IV   YOB: 1985  Date of Visit: 01/28/2019    To Whom It May Concern:    Sallie Gallagher IV  was at [facility] on 01/28/2019. He may return to work/school on 1/31/2019 with no restrictions. If you have any questions or concerns, or if I can be of further assistance, please do not hesitate to contact me.    Sincerely,      Maureen Olson MD

## 2019-01-28 NOTE — PROGRESS NOTES
Subjective:       Patient ID: Kwabena Gallagher IV is a 33 y.o. male.    Vitals:  height is 6' (1.829 m) and weight is 68 kg (150 lb). His temperature is 98.1 °F (36.7 °C). His blood pressure is 129/84 and his pulse is 94. His oxygen saturation is 98%.     Chief Complaint: Dizziness    Pt reports he has been having dizziness for 4 weeks , pt was recently seen in the ED 3 weeks ago.  Patient reports no improvement of his dizziness after being treated for sinusitis.  He did feel better after receiving 2 L of IV fluids.  His glucose has been well controlled with his glucose pump.  He reports having to drink a huge amount of water in order to not be dizzy and not pass out.  He reports having passed out twice last week.  He drinks at least 5 bottles of water in 2 hr.  He reports his glucose is not over 180 at any time.  He reports no ketones in his urine.  He reports dizziness such at times when he stands too quickly if he has not kept up with his water intake.  He has not followed up with his endocrinologist since his emergency room visit.      Dizziness:   Chronicity:  New and chronic  Onset:  1 to 4 weeks ago  Frequency:  Constantly  Duration:  1 minuteno fever, no headaches, no nausea, no vomiting, no light-headedness and no chest pain.      Constitution: Negative for chills, fatigue and fever.   HENT: Negative for congestion and sore throat.    Neck: Negative for painful lymph nodes.   Cardiovascular: Negative for chest pain and leg swelling.   Eyes: Negative for double vision and blurred vision.   Respiratory: Negative for cough and shortness of breath.    Gastrointestinal: Negative for nausea, vomiting and diarrhea.   Genitourinary: Negative for dysuria, frequency and urgency.   Musculoskeletal: Negative for joint pain, joint swelling, muscle cramps and muscle ache.   Skin: Negative for color change, pale and rash.   Allergic/Immunologic: Negative for seasonal allergies.   Neurological: Positive for  dizziness. Negative for history of vertigo, light-headedness, passing out and headaches.   Hematologic/Lymphatic: Negative for swollen lymph nodes, easy bruising/bleeding and history of blood clots. Does not bruise/bleed easily.   Psychiatric/Behavioral: Negative for nervous/anxious, sleep disturbance and depression. The patient is not nervous/anxious.        Objective:      Physical Exam   Constitutional: He is oriented to person, place, and time. He appears well-developed and well-nourished. He is cooperative.  Non-toxic appearance. He does not appear ill. No distress.   HENT:   Head: Normocephalic and atraumatic.   Right Ear: Hearing, tympanic membrane, external ear and ear canal normal.   Left Ear: Hearing, tympanic membrane, external ear and ear canal normal.   Nose: Nose normal. No mucosal edema, rhinorrhea or nasal deformity. No epistaxis. Right sinus exhibits no maxillary sinus tenderness and no frontal sinus tenderness. Left sinus exhibits no maxillary sinus tenderness and no frontal sinus tenderness.   Mouth/Throat: Uvula is midline, oropharynx is clear and moist and mucous membranes are normal. No trismus in the jaw. Normal dentition. No uvula swelling. No posterior oropharyngeal erythema.   Eyes: Conjunctivae and lids are normal. Right eye exhibits no discharge. Left eye exhibits no discharge. No scleral icterus.   Sclera clear bilat   Neck: Trachea normal, normal range of motion, full passive range of motion without pain and phonation normal. Neck supple.   Cardiovascular: Normal rate, regular rhythm, normal heart sounds, intact distal pulses and normal pulses.   Pulmonary/Chest: Effort normal and breath sounds normal. No respiratory distress.   Abdominal: Soft. Normal appearance and bowel sounds are normal. He exhibits no distension, no pulsatile midline mass and no mass. There is no tenderness.   Musculoskeletal: Normal range of motion. He exhibits no edema or deformity.   Neurological: He is alert  and oriented to person, place, and time. He exhibits normal muscle tone. Coordination normal.   Skin: Skin is warm, dry and intact. He is not diaphoretic. No pallor.   Psychiatric: He has a normal mood and affect. His speech is normal and behavior is normal. Judgment and thought content normal. Cognition and memory are normal.   Nursing note and vitals reviewed.      Assessment:       1. Postural dizziness with near syncope        Plan:         Postural dizziness with near syncope      Patient Instructions     Causes of Syncope  Syncope (fainting) has many causes. Sometimes it is not serious. In other cases, syncope is a sign of a heart problem. But treatment can help    When syncope is not serious  Your healthcare provider may call your problem vasovagal syncope, reflex syncope, or orthostatic hypotension. These types of syncope are generally not serious. They can be caused by:  · Strong feelings, such as anxiety or fear. A nerve signal may briefly change your heart rate and lower your blood pressure too much.  · Standing for too long. Standing may cause blood to pool in your legs. When this happens, your brain may not receive all the blood it needs.  · Standing up too quickly. Your blood pressure may not adjust fast enough to changes in posture and may drop too low. Certain medicines can also cause this problem. Examples of medicines that can cause a drop in blood pressure include diuretics, blood pressure medicines, and medicines for chest pain. Your pharmacist or healthcare provider can discuss these with you.  · Reaction to normal body functions. When you go to the bathroom, have gastrointestinal discomfort, nausea, or pain, your heart may have a natural reflex to slow down and lower blood pressure. This can result in syncope. This may also follow exercise, eating, laughter, weight lifting, or playing musical instruments like the trumpet or trombone.  When heart trouble causes syncope  A heart problem can  decrease the amount of oxygen-rich blood that reaches the brain. Heart trouble can be serious and even life threatening if not treated:  · A slow heart rate. Electrical signals tell the chambers of the heart when to pump. But the signals may be slowed or blocked (heart block) as they travel on the hearts electrical pathways. This can be caused by aging, scarred heart tissue, or damage from heart disease. When the heart rate slows, not enough blood is pumped.  · A fast heart rate. Certain problems can make the heart race. For instance, after a heart attack, also known as acute myocardial infarction, or AMI, abnormal electrical signals may be created. These signals can make the heart suddenly beat very fast. The heart pumps before the chambers can fill with blood. So less blood reaches the brain and other parts of the body. Illegal drugs, certain medicines, heart disease, or an inherited condition can also cause this.  · A heart valve problem. Blood travels through the chambers of the heart as it is pumped. Heart valves open and close to help move blood in the right direction. But a valve may not open or close fully, if its hardened or scarred. As a result, less blood is pumped through the heart to the brain and body. Most often, syncope occurs when a person's aortic valve is critically narrowed and he or she participates in  a strenuous activity.  · A heart muscle problem. Some people develop a thickened heart muscle that blocks blood flow out of the heart to the body. This is called hypertrophic cardiomyopathy. Being dehydrated and having hypertrophic cardiomyopathy can increase the risk for syncope.  Whatever the cause of syncope, it is important to be evaluated by your healthcare provider. You may need to be seen by a cardiologist, neurologist, or an ear, nose, and throat specialist. Do not drive, operate heavy machinery, or participate in activities in which you would be at risk for falls and injury if you have  syncope and have not been evaluated.  Date Last Reviewed: 5/1/2016  © 2187-9647 [employer]. [address] [place] [address], [place] [address]. All rights reserved. This information is not intended as a substitute for professional medical care. Always follow your healthcare professional's instructions.        Antidiuretic Hormone  Does this test have other names?  Vasopressin, arginine vasopressin, ADH  What is this test?  This test measures how much antidiuretic hormone (ADH) is in your blood.  ADH is made by your hypothalamus. ADH keeps the amount of water in your body in balance. Certain conditions can affect the amount of ADH that your body makes. These include hyponatremia, or low sodium levels in your bodily fluids. They also include diabetes insipidus. Symptoms of this condition include urinating often and being very thirsty.  Why do I need this test?  You may have this test if your health care provider thinks you have a problem that affects your ADH levels.   What other tests might I have along with this test?  Your health care provider may also order other tests, including a water deprivation test. In this test, you stop drinking fluids for several hours. Then your urine and blood are measured to see how many solid particles they have.  Your provider may also order a water loading test. In this test, you drink certain amounts of water. Then your provider measures the water levels in your urine over time.  What do my test results mean?  Many things may affect your lab test results. These include the method each lab uses to do the test. Even if your test results are different from the normal value, you may not have a problem. To learn what the results mean for you, talk with your health care provider.  Normal ADH levels in adults vary from 0 to 5 pg/mL.  Higher than normal results may mean that you have lung tumors, central nervous system tumors, or a fluid problem after surgery. Or you may  have porphyria. This is a very rare blood enzyme deficiency.  Low levels of ADH may mean you have diabetes insipidus or damage to the pituitary gland. Or you may have primary polydipsia. This is extreme thirst because of hypothalamus problems or mental illness.  How is this test done?  The test requires a blood sample, which is drawn through a needle from a vein in your arm.  Does this test pose any risks?  Taking a blood sample with a needle carries risks that include bleeding, infection, bruising, or feeling dizzy. When the needle pricks your arm, you may feel a slight stinging sensation or pain. Afterward, the site may be slightly sore.  What might affect my test results?  Rarely smoking can make ADH levels higher. Other uncommon causes of higher levels of ADH are pregnancy and taking morphine or certain antidepressants.     How do I get ready for this test?  Tell your health care provider if you smoke, drink alcohol, or take medicines.  Be sure your provider knows about all medicines, herbs, vitamins, and supplements you are taking. This includes medicines that don't need a prescription and any illicit drugs you may use.      © 5675-7835 [employer]. [address] [place] [id number]. All rights reserved. This information is not intended as a substitute for professional medical care. Always follow your healthcare professional's instructions.        Fainting: Uncertain Cause  Fainting (syncope) is a temporary loss of consciousness, which is often associated with a loss of postural tone. There are other causes of fainting, too. Its also called passing out. It occurs when blood flow to the brain is less than normal. Near-fainting (near-syncope) is very similar to fainting, but you dont fully pass out.  Common minor causes of fainting include:  · Sudden fear  · Pain  · Nausea  · Emotional stress  · Overexertion  Suddenly standing up after sitting or lying for a long time can also cause  fainting.  More serious causes of fainting include:  · Very slow or very fast heartbeat (arrhythmia)  · Other types of heart disease, such as heart valve disease or coronary artery disease  · Dehydration  · Loss of blood  · Seizure  · Stroke  · Ruptured blood vessel in the brain  Taking too much high blood pressure medicine can also cause low blood pressure and fainting.  Your healthcare provider does not know the exact cause of your fainting. But the tests today did not show any of the serious causes of fainting. Sometimes you may need more tests to find out if you have a serious problem. Thats why its important to follow up with your provider as advised.  Home care  Follow these guidelines when caring for yourself at home:  · Rest today. You may go back to your normal activities when you are feeling back to normal. It is best to stay with someone who can check on you for the next 24 hours to watch for another episode of fainting.  · If you become lightheaded or dizzy, lie down right away and try to prop your feet above the level of your head. Or sit with your head between your knees.  · Because the provider doesnt know the exact cause of your fainting or near-fainting spell, its possible for you to have another spell without warning. Because of this, dont drive a car or operate dangerous equipment. Dont take a bath alone. Use a shower instead. Dont swim alone until your healthcare provider says that you are no longer in danger of having another fainting spell.  Follow-up care  Follow up with your healthcare provider, or as advised.  When to seek medical advice  Call your healthcare provider right away if any of these occur:  · Another fainting spell thats not explained by the common causes listed above  · Pain in your chest, arm, neck, jaw, back, or abdomen  · Shortness of breath  · Severe headache or seizure  · Blood in vomit or stools (black or red color)  · Unexpected vaginal bleeding  · Your heart  beats very rapidly, very slowly, or irregularly (palpitations)  Also call your provider if you have signs of stroke:  · Weakness in an arm or leg or on one side of the face  · Difficulty speaking or seeing  · Extreme drowsiness, confusion, dizziness, or fainting  Date Last Reviewed: 12/1/2016  © 7712-6614 [employer]. [address] [place] [address]. All rights reserved. This information is not intended as a substitute for professional medical care. Always follow your healthcare professional's instructions.

## 2019-01-28 NOTE — PATIENT INSTRUCTIONS
Causes of Syncope  Syncope (fainting) has many causes. Sometimes it is not serious. In other cases, syncope is a sign of a heart problem. But treatment can help    When syncope is not serious  Your healthcare provider may call your problem vasovagal syncope, reflex syncope, or orthostatic hypotension. These types of syncope are generally not serious. They can be caused by:  · Strong feelings, such as anxiety or fear. A nerve signal may briefly change your heart rate and lower your blood pressure too much.  · Standing for too long. Standing may cause blood to pool in your legs. When this happens, your brain may not receive all the blood it needs.  · Standing up too quickly. Your blood pressure may not adjust fast enough to changes in posture and may drop too low. Certain medicines can also cause this problem. Examples of medicines that can cause a drop in blood pressure include diuretics, blood pressure medicines, and medicines for chest pain. Your pharmacist or healthcare provider can discuss these with you.  · Reaction to normal body functions. When you go to the bathroom, have gastrointestinal discomfort, nausea, or pain, your heart may have a natural reflex to slow down and lower blood pressure. This can result in syncope. This may also follow exercise, eating, laughter, weight lifting, or playing musical instruments like the trumpet or trombone.  When heart trouble causes syncope  A heart problem can decrease the amount of oxygen-rich blood that reaches the brain. Heart trouble can be serious and even life threatening if not treated:  · A slow heart rate. Electrical signals tell the chambers of the heart when to pump. But the signals may be slowed or blocked (heart block) as they travel on the hearts electrical pathways. This can be caused by aging, scarred heart tissue, or damage from heart disease. When the heart rate slows, not enough blood is pumped.  · A fast heart rate. Certain problems can make the  heart race. For instance, after a heart attack, also known as acute myocardial infarction, or AMI, abnormal electrical signals may be created. These signals can make the heart suddenly beat very fast. The heart pumps before the chambers can fill with blood. So less blood reaches the brain and other parts of the body. Illegal drugs, certain medicines, heart disease, or an inherited condition can also cause this.  · A heart valve problem. Blood travels through the chambers of the heart as it is pumped. Heart valves open and close to help move blood in the right direction. But a valve may not open or close fully, if its hardened or scarred. As a result, less blood is pumped through the heart to the brain and body. Most often, syncope occurs when a person's aortic valve is critically narrowed and he or she participates in  a strenuous activity.  · A heart muscle problem. Some people develop a thickened heart muscle that blocks blood flow out of the heart to the body. This is called hypertrophic cardiomyopathy. Being dehydrated and having hypertrophic cardiomyopathy can increase the risk for syncope.  Whatever the cause of syncope, it is important to be evaluated by your healthcare provider. You may need to be seen by a cardiologist, neurologist, or an ear, nose, and throat specialist. Do not drive, operate heavy machinery, or participate in activities in which you would be at risk for falls and injury if you have syncope and have not been evaluated.  Date Last Reviewed: 5/1/2016  © 4400-8399 [employer] [address] [place] [address], [place] [address]. All rights reserved. This information is not intended as a substitute for professional medical care. Always follow your healthcare professional's instructions.        Antidiuretic Hormone  Does this test have other names?  Vasopressin, arginine vasopressin, ADH  What is this test?  This test measures how much antidiuretic hormone (ADH) is in your blood.  ADH is  made by your hypothalamus. ADH keeps the amount of water in your body in balance. Certain conditions can affect the amount of ADH that your body makes. These include hyponatremia, or low sodium levels in your bodily fluids. They also include diabetes insipidus. Symptoms of this condition include urinating often and being very thirsty.  Why do I need this test?  You may have this test if your health care provider thinks you have a problem that affects your ADH levels.   What other tests might I have along with this test?  Your health care provider may also order other tests, including a water deprivation test. In this test, you stop drinking fluids for several hours. Then your urine and blood are measured to see how many solid particles they have.  Your provider may also order a water loading test. In this test, you drink certain amounts of water. Then your provider measures the water levels in your urine over time.  What do my test results mean?  Many things may affect your lab test results. These include the method each lab uses to do the test. Even if your test results are different from the normal value, you may not have a problem. To learn what the results mean for you, talk with your health care provider.  Normal ADH levels in adults vary from 0 to 5 pg/mL.  Higher than normal results may mean that you have lung tumors, central nervous system tumors, or a fluid problem after surgery. Or you may have porphyria. This is a very rare blood enzyme deficiency.  Low levels of ADH may mean you have diabetes insipidus or damage to the pituitary gland. Or you may have primary polydipsia. This is extreme thirst because of hypothalamus problems or mental illness.  How is this test done?  The test requires a blood sample, which is drawn through a needle from a vein in your arm.  Does this test pose any risks?  Taking a blood sample with a needle carries risks that include bleeding, infection, bruising, or feeling dizzy.  When the needle pricks your arm, you may feel a slight stinging sensation or pain. Afterward, the site may be slightly sore.  What might affect my test results?  Rarely smoking can make ADH levels higher. Other uncommon causes of higher levels of ADH are pregnancy and taking morphine or certain antidepressants.     How do I get ready for this test?  Tell your health care provider if you smoke, drink alcohol, or take medicines.  Be sure your provider knows about all medicines, herbs, vitamins, and supplements you are taking. This includes medicines that don't need a prescription and any illicit drugs you may use.      © 2471-2803 [employer]. [address] [place] [address], [place] [address]. All rights reserved. This information is not intended as a substitute for professional medical care. Always follow your healthcare professional's instructions.        Fainting: Uncertain Cause  Fainting (syncope) is a temporary loss of consciousness, which is often associated with a loss of postural tone. There are other causes of fainting, too. Its also called passing out. It occurs when blood flow to the brain is less than normal. Near-fainting (near-syncope) is very similar to fainting, but you dont fully pass out.  Common minor causes of fainting include:  · Sudden fear  · Pain  · Nausea  · Emotional stress  · Overexertion  Suddenly standing up after sitting or lying for a long time can also cause fainting.  More serious causes of fainting include:  · Very slow or very fast heartbeat (arrhythmia)  · Other types of heart disease, such as heart valve disease or coronary artery disease  · Dehydration  · Loss of blood  · Seizure  · Stroke  · Ruptured blood vessel in the brain  Taking too much high blood pressure medicine can also cause low blood pressure and fainting.  Your healthcare provider does not know the exact cause of your fainting. But the tests today did not show any of the serious causes of fainting. Sometimes  you may need more tests to find out if you have a serious problem. Thats why its important to follow up with your provider as advised.  Home care  Follow these guidelines when caring for yourself at home:  · Rest today. You may go back to your normal activities when you are feeling back to normal. It is best to stay with someone who can check on you for the next 24 hours to watch for another episode of fainting.  · If you become lightheaded or dizzy, lie down right away and try to prop your feet above the level of your head. Or sit with your head between your knees.  · Because the provider doesnt know the exact cause of your fainting or near-fainting spell, its possible for you to have another spell without warning. Because of this, dont drive a car or operate dangerous equipment. Dont take a bath alone. Use a shower instead. Dont swim alone until your healthcare provider says that you are no longer in danger of having another fainting spell.  Follow-up care  Follow up with your healthcare provider, or as advised.  When to seek medical advice  Call your healthcare provider right away if any of these occur:  · Another fainting spell thats not explained by the common causes listed above  · Pain in your chest, arm, neck, jaw, back, or abdomen  · Shortness of breath  · Severe headache or seizure  · Blood in vomit or stools (black or red color)  · Unexpected vaginal bleeding  · Your heart beats very rapidly, very slowly, or irregularly (palpitations)  Also call your provider if you have signs of stroke:  · Weakness in an arm or leg or on one side of the face  · Difficulty speaking or seeing  · Extreme drowsiness, confusion, dizziness, or fainting  Date Last Reviewed: 12/1/2016  © 6650-6720 [employer]. [address] [place] [address], [place] [address]. All rights reserved. This information is not intended as a substitute for professional medical care. Always follow your healthcare professional's  instructions.

## 2022-05-20 ENCOUNTER — OFFICE VISIT (OUTPATIENT)
Dept: URGENT CARE | Facility: CLINIC | Age: 37
End: 2022-05-20
Payer: MEDICAID

## 2022-05-20 ENCOUNTER — HOSPITAL ENCOUNTER (EMERGENCY)
Facility: HOSPITAL | Age: 37
Discharge: HOME | End: 2022-05-20
Attending: FAMILY MEDICINE
Payer: MEDICAID

## 2022-05-20 VITALS
HEIGHT: 72 IN | HEART RATE: 78 BPM | BODY MASS INDEX: 21.59 KG/M2 | OXYGEN SATURATION: 99 % | WEIGHT: 159.38 LBS | TEMPERATURE: 98 F | RESPIRATION RATE: 18 BRPM | DIASTOLIC BLOOD PRESSURE: 77 MMHG | SYSTOLIC BLOOD PRESSURE: 108 MMHG

## 2022-05-20 VITALS
TEMPERATURE: 99 F | WEIGHT: 162.06 LBS | OXYGEN SATURATION: 98 % | BODY MASS INDEX: 21.95 KG/M2 | RESPIRATION RATE: 18 BRPM | HEIGHT: 72 IN | HEART RATE: 64 BPM | DIASTOLIC BLOOD PRESSURE: 81 MMHG | SYSTOLIC BLOOD PRESSURE: 125 MMHG

## 2022-05-20 DIAGNOSIS — H93.19 TINNITUS, UNSPECIFIED LATERALITY: Primary | ICD-10-CM

## 2022-05-20 DIAGNOSIS — E10.9 TYPE 1 DIABETES MELLITUS WITHOUT COMPLICATION: ICD-10-CM

## 2022-05-20 DIAGNOSIS — R42 VERTIGO: ICD-10-CM

## 2022-05-20 DIAGNOSIS — H81.399 PERIPHERAL VERTIGO, UNSPECIFIED LATERALITY: Primary | ICD-10-CM

## 2022-05-20 DIAGNOSIS — R82.4 KETONURIA: ICD-10-CM

## 2022-05-20 LAB
ANION GAP SERPL CALC-SCNC: 9 MEQ/L
BILIRUB UR QL STRIP: NEGATIVE
BUN SERPL-MCNC: 16.5 MG/DL (ref 8.9–20.6)
CALCIUM SERPL-MCNC: 9.4 MG/DL (ref 8.4–10.2)
CHLORIDE SERPL-SCNC: 103 MMOL/L (ref 98–107)
CO2 SERPL-SCNC: 30 MMOL/L (ref 22–29)
CREAT SERPL-MCNC: 0.81 MG/DL (ref 0.73–1.18)
CREAT/UREA NIT SERPL: 20
CTP QC/QA: YES
GLUCOSE SERPL-MCNC: 159 MG/DL (ref 70–110)
GLUCOSE SERPL-MCNC: 67 MG/DL (ref 74–100)
GLUCOSE UR QL STRIP: POSITIVE
KETONES UR QL STRIP: POSITIVE
LEUKOCYTE ESTERASE UR QL STRIP: NEGATIVE
PH, POC UA: 7
POC BLOOD, URINE: NEGATIVE
POC NITRATES, URINE: NEGATIVE
POTASSIUM SERPL-SCNC: 4.1 MMOL/L (ref 3.5–5.1)
PROT UR QL STRIP: NEGATIVE
SARS-COV-2 RDRP RESP QL NAA+PROBE: NEGATIVE
SODIUM SERPL-SCNC: 142 MMOL/L (ref 136–145)
SP GR UR STRIP: 1.02 (ref 1–1.03)
UROBILINOGEN UR STRIP-ACNC: 1 (ref 0.3–2.2)

## 2022-05-20 PROCEDURE — 82962 GLUCOSE BLOOD TEST: CPT | Mod: PBBFAC | Performed by: NURSE PRACTITIONER

## 2022-05-20 PROCEDURE — 99213 OFFICE O/P EST LOW 20 MIN: CPT | Mod: S$PBB,,, | Performed by: NURSE PRACTITIONER

## 2022-05-20 PROCEDURE — 25000003 PHARM REV CODE 250: Performed by: PHYSICIAN ASSISTANT

## 2022-05-20 PROCEDURE — 3008F BODY MASS INDEX DOCD: CPT | Mod: CPTII,,, | Performed by: NURSE PRACTITIONER

## 2022-05-20 PROCEDURE — 1160F PR REVIEW ALL MEDS BY PRESCRIBER/CLIN PHARMACIST DOCUMENTED: ICD-10-PCS | Mod: CPTII,,, | Performed by: NURSE PRACTITIONER

## 2022-05-20 PROCEDURE — 3074F SYST BP LT 130 MM HG: CPT | Mod: CPTII,,, | Performed by: NURSE PRACTITIONER

## 2022-05-20 PROCEDURE — 99284 EMERGENCY DEPT VISIT MOD MDM: CPT | Mod: 25

## 2022-05-20 PROCEDURE — 1159F PR MEDICATION LIST DOCUMENTED IN MEDICAL RECORD: ICD-10-PCS | Mod: CPTII,,, | Performed by: NURSE PRACTITIONER

## 2022-05-20 PROCEDURE — 1159F MED LIST DOCD IN RCRD: CPT | Mod: CPTII,,, | Performed by: NURSE PRACTITIONER

## 2022-05-20 PROCEDURE — 99213 PR OFFICE/OUTPT VISIT, EST, LEVL III, 20-29 MIN: ICD-10-PCS | Mod: S$PBB,,, | Performed by: NURSE PRACTITIONER

## 2022-05-20 PROCEDURE — 3008F PR BODY MASS INDEX (BMI) DOCUMENTED: ICD-10-PCS | Mod: CPTII,,, | Performed by: NURSE PRACTITIONER

## 2022-05-20 PROCEDURE — 3079F PR MOST RECENT DIASTOLIC BLOOD PRESSURE 80-89 MM HG: ICD-10-PCS | Mod: CPTII,,, | Performed by: NURSE PRACTITIONER

## 2022-05-20 PROCEDURE — 99205 OFFICE O/P NEW HI 60 MIN: CPT | Mod: PBBFAC,25,27 | Performed by: NURSE PRACTITIONER

## 2022-05-20 PROCEDURE — 3074F PR MOST RECENT SYSTOLIC BLOOD PRESSURE < 130 MM HG: ICD-10-PCS | Mod: CPTII,,, | Performed by: NURSE PRACTITIONER

## 2022-05-20 PROCEDURE — 36415 COLL VENOUS BLD VENIPUNCTURE: CPT | Performed by: PHYSICIAN ASSISTANT

## 2022-05-20 PROCEDURE — 3079F DIAST BP 80-89 MM HG: CPT | Mod: CPTII,,, | Performed by: NURSE PRACTITIONER

## 2022-05-20 PROCEDURE — U0002 COVID-19 LAB TEST NON-CDC: HCPCS | Mod: PBBFAC | Performed by: NURSE PRACTITIONER

## 2022-05-20 PROCEDURE — 80048 BASIC METABOLIC PNL TOTAL CA: CPT | Performed by: PHYSICIAN ASSISTANT

## 2022-05-20 PROCEDURE — 1160F RVW MEDS BY RX/DR IN RCRD: CPT | Mod: CPTII,,, | Performed by: NURSE PRACTITIONER

## 2022-05-20 PROCEDURE — 81003 URINALYSIS AUTO W/O SCOPE: CPT | Mod: PBBFAC | Performed by: NURSE PRACTITIONER

## 2022-05-20 RX ORDER — INSULIN LISPRO 100 [IU]/ML
INJECTION, SOLUTION INTRAVENOUS; SUBCUTANEOUS
COMMUNITY
Start: 2022-05-16 | End: 2022-08-10

## 2022-05-20 RX ORDER — MECLIZINE HYDROCHLORIDE 25 MG/1
25 TABLET ORAL 3 TIMES DAILY PRN
Qty: 20 TABLET | Refills: 0 | Status: SHIPPED | OUTPATIENT
Start: 2022-05-20 | End: 2023-04-19

## 2022-05-20 RX ORDER — BLOOD SUGAR DIAGNOSTIC
STRIP MISCELLANEOUS
COMMUNITY
Start: 2022-04-28

## 2022-05-20 RX ORDER — MECLIZINE HYDROCHLORIDE 25 MG/1
25 TABLET ORAL
Status: COMPLETED | OUTPATIENT
Start: 2022-05-20 | End: 2022-05-20

## 2022-05-20 RX ADMIN — MECLIZINE HYDROCHLORIDE 25 MG: 25 TABLET ORAL at 12:05

## 2022-05-20 NOTE — DISCHARGE INSTRUCTIONS
Report to Emergency Department if symptoms return or worsen; [facility] Within 1 to 2 days, It is important that you follow up with your primary care provider or specialist if indicated for further evaluation, workup, and treatment as necessary. The exam and treatment you received in Emergency Department was for an urgent problem and NOT INTENDED AS COMPLETE CARE. It is important that you FOLLOW UP with a doctor for ongoing care. If your symptoms become WORSE or you DO NOT IMPROVE and you are unable to reach your health care provider, you should RETURN to the Emergency Department. The Emergency Department provider has provided a PRELIMINARY INTERPRETATION of all your studies. A final interpretation may be done after you are discharged. If a change in your diagnosis or treatment is needed WE WILL CONTACT YOU. It is critical that we have a CURRENT PHONE NUMBER FOR YOU.

## 2022-05-20 NOTE — ED PROVIDER NOTES
Encounter Date: 5/20/2022       History     Chief Complaint   Patient presents with    Dizziness     C/o vertigo and ringing in ears x 4 days. Hx of vertigo    Tinnitus     35 yo M w/ PMHx significant for T1DM presents to ED via [facility] for DKA rule-out. Patient originally went to [facility] for evaluation of 4 day hx of tinnitus & vertigo. UA revealed ketonuria & glucosuria, so patient was sent to rule out DKA. CBG of 159 at [facility]. Denies N/V or abdominal pain. Reports vertigo is worsened w/ head movements. Also reports speech difficulties for past 6-7 years & reports extensive family hx of tumors. Denies vision changes, facial drooping, AMS, confusion, recent head trauma, neck stiffness, focal weakness, paralysis, paresthesia, numbness, ataxia, sinus congestion, rhinorrhea, sore throat, ear pain        Review of patient's allergies indicates:  No Known Allergies  Past Medical History:   Diagnosis Date    Diabetes mellitus type I      History reviewed. No pertinent surgical history.  Family History   Problem Relation Age of Onset    Lupus Mother     Rheum arthritis Mother     Lung cancer Father      Social History     Tobacco Use    Smoking status: Former Smoker     Types: Cigarettes    Smokeless tobacco: Former User   Substance Use Topics    Alcohol use: Never     Alcohol/week: 21.0 standard drinks     Types: 21 Shots of liquor per week    Drug use: No     Review of Systems   Constitutional: Negative for chills, fatigue and fever.   HENT: Positive for tinnitus. Negative for ear discharge, ear pain, hearing loss, rhinorrhea, sinus pressure, sinus pain and sore throat.    Eyes: Negative for photophobia and visual disturbance.   Respiratory: Negative for cough and shortness of breath.    Cardiovascular: Negative for chest pain and palpitations.   Gastrointestinal: Negative for diarrhea, nausea and vomiting.   Endocrine: Negative for polydipsia and polyuria.   Musculoskeletal: Negative for back pain, myalgias, neck  pain and neck stiffness.   Skin: Negative for rash.   Neurological: Positive for dizziness. Negative for seizures, syncope, facial asymmetry, speech difficulty, weakness, light-headedness, numbness and headaches.   Hematological: Negative for adenopathy.       Physical Exam     Initial Vitals [05/20/22 1117]   BP Pulse Resp Temp SpO2   119/78 60 20 97.5 °F (36.4 °C) 97 %      MAP       --         Physical Exam    Constitutional: He appears well-developed and well-nourished. No distress.   HENT:   Head: Normocephalic and atraumatic.   Right Ear: External ear normal.   Left Ear: External ear normal.   Mouth/Throat: Oropharynx is clear and moist.   Eyes: Conjunctivae are normal. Pupils are equal, round, and reactive to light.   Neck: Neck supple.   Normal range of motion.  Cardiovascular: Normal rate, regular rhythm, normal heart sounds and intact distal pulses. Exam reveals no friction rub.    No murmur heard.  Pulmonary/Chest: Breath sounds normal.   Abdominal: Abdomen is soft. Bowel sounds are normal.   Musculoskeletal:         General: Normal range of motion.      Cervical back: Normal range of motion and neck supple.     Neurological: He is alert and oriented to person, place, and time. He has normal strength. No cranial nerve deficit or sensory deficit. He displays a negative Romberg sign. Coordination and gait normal. GCS score is 15. GCS eye subscore is 4. GCS verbal subscore is 5. GCS motor subscore is 6.   Falls to L on romberg   Skin: Skin is warm and dry. Capillary refill takes less than 2 seconds.   Psychiatric: He has a normal mood and affect.         ED Course   Procedures  Labs Reviewed   BASIC METABOLIC PANEL - Abnormal; Notable for the following components:       Result Value    Carbon Dioxide 30 (*)     Glucose Level 67 (*)     All other components within normal limits   EXTRA TUBES    Narrative:     The following orders were created for panel order EXTRA TUBES.  Procedure                                Abnormality         Status                     ---------                               -----------         ------                     Light Blue Top Hold[[id number]]                              In process                 Gold Top Hold[[id number]]                                    In process                 Pink Top Hold[[id number]]                                    In process                   Please view results for these tests on the individual orders.   LIGHT BLUE TOP HOLD   GOLD TOP HOLD   PINK TOP HOLD          Imaging Results          CT Head Without Contrast (Final result)  Result time 05/20/22 13:26:36    Final result by Alec Barrera MD (05/20/22 13:26:36)                 Impression:      No acute intracranial findings.      Electronically signed by: Alec Barrera  Date:    05/20/2022  Time:    13:26             Narrative:    EXAMINATION:  CT HEAD WITHOUT CONTRAST    CLINICAL HISTORY:  vertigo;    TECHNIQUE:  CT imaging of the head performed from the skull base to the vertex without intravenous contrast.  mGycm. Automatic exposure control, adjustment of mA/kV or iterative reconstruction technique was used to reduce radiation.    COMPARISON:  None Available.    FINDINGS:  There is no acute cortical infarct, hemorrhage or mass lesion.  The ventricles are normal in size.    Visualized paranasal sinuses and mastoid air cells are clear.                                 Medications   meclizine tablet 25 mg (25 mg Oral Given 5/20/22 1257)     Medical Decision Making:   Clinical Tests:   Lab Tests: Ordered and Reviewed  Radiological Study: Ordered and Reviewed  Patient has normal AG w/o hyperglycemia. No abdominal pain or N/V. Patient is not in DKA. Unremarkable neuro exam. CT head unremarkable. Reports improvement of symptoms w/ meclizine. Presentation consistent w/ peripheral vertigo. Patient already has follow-up appointment scheduled in clinic. Strict ED precautions given                      Clinical  Impression:   Final diagnoses:  [H81.399] Peripheral vertigo, unspecified laterality (Primary)          ED Disposition Condition    Discharge Stable        ED Prescriptions     Medication Sig Dispense Start Date End Date Auth. Provider    meclizine (ANTIVERT) 25 mg tablet Take 1 tablet (25 mg total) by mouth 3 (three) times daily as needed for Dizziness. 20 tablet 5/20/2022  MAGDALENA Lau        Follow-up Information     Follow up With Specialties Details Why Contact Info    Lesa Young CRNA   Keep scheduled appointment [address] [place] [address] [place]  [place] [address]  [phone]             MAGDALENA Lau  05/20/22 6861

## 2022-05-20 NOTE — PROGRESS NOTES
"Subjective:       Patient ID: Kwabena Gallagher IV is a 36 y.o. male.    Vitals:  height is 5' 11.65" (1.82 m) and weight is 73.5 kg (162 lb 0.6 oz). His temperature is 98.6 °F (37 °C). His blood pressure is 125/81 and his pulse is 64. His respiration is 18 and oxygen saturation is 98%.     Chief Complaint: Tinnitus, Dizziness, and Nausea (Mild nausea,states vertigo and tinnitus becoming worse, bs 132 x 5min ago has insuling pump and cgm)    Onset of nausea this morning. States when nurse here at  checked spot glucose, his CGM was approx 20 points lower than the POC monitor. States he gets a reading every 5 minutes. States he is having vertigo or dizziness. Had it in the past, went to ER, rec'd 2 liters of IVF, no relief. Has never seen ENT or taken any specific medication for vertigo. Has not had fevers but has been measuring his temp at home. Answers equivocally when I ask about other symptoms of illness such as nasal congestion, cough, sore throat, the polys. Does report diarrhea but "nothing out of the norm for sugar substitutes." States that "symptoms of diabetes are pretty much" under control. Has been admitted in the past x1 with DKA 8 years ago. Does not know what his glucose was at that time, poor recall of the event.       HENT: Positive for tinnitus, congestion and postnasal drip.    Gastrointestinal: Positive for nausea.   Allergic/Immunologic: Positive for environmental allergies.   Neurological: Positive for dizziness and history of vertigo.   Psychiatric/Behavioral: Positive for history of mental illness.       Objective:      Physical Exam   Constitutional: He is oriented to person, place, and time. He does not appear ill.   HENT:   Ears:   Right Ear: A middle ear effusion is present.   Left Ear: A middle ear effusion is present.   Mouth/Throat: Posterior oropharyngeal erythema present.   Serous otitis media with wide light reflexes  Post nasal drip      Comments: Serous otitis media with wide " light reflexes  Post nasal drip  Cardiovascular: Normal rate and regular rhythm.   Pulmonary/Chest: Breath sounds normal.   Abdominal: Bowel sounds are normal. Soft. flat abdomen   Neurological: no focal deficit. He is alert and oriented to person, place, and time.   Skin: Skin is warm and dry.   Psychiatric: His behavior is normal.         Assessment:       1. Tinnitus, unspecified laterality    2. Vertigo    3. Type 1 diabetes mellitus without complication    4. Ketonuria          Results for orders placed or performed in visit on 05/20/22   POCT Glucose, Hand-Held Device   Result Value Ref Range    POC Glucose 159 (A) 70 - 110 MG/DL   POCT Urinalysis, Dipstick, Automated, W/O Scope   Result Value Ref Range    POC Blood, Urine Negative Negative    POC Bilirubin, Urine Negative Negative    POC Urobilinogen, Urine 1.0 0.3 - 2.2    POC Ketones, Urine Positive (A) Negative    POC Protein, Urine Negative Negative    POC Nitrates, Urine Negative Negative    POC Glucose, Urine Positive (A) Negative    pH, UA 7.0     POC Specific Gravity, Urine 1.025 1.003 - 1.029    POC Leukocytes, Urine Negative Negative   POCT COVID-19 Rapid Screening   Result Value Ref Range    POC Rapid COVID Negative Negative     Acceptable Yes      Plan:         Tinnitus, unspecified laterality  -     POCT Glucose, Hand-Held Device  -     POCT Urinalysis, Dipstick, Automated, W/O Scope  -     POCT COVID-19 Rapid Screening    Vertigo  -     POCT Glucose, Hand-Held Device  -     POCT Urinalysis, Dipstick, Automated, W/O Scope  -     POCT COVID-19 Rapid Screening    Type 1 diabetes mellitus without complication  -     POCT Glucose, Hand-Held Device  -     POCT Urinalysis, Dipstick, Automated, W/O Scope  -     POCT COVID-19 Rapid Screening    Ketonuria       Send to [facility] ED for labs to r/i r/o DKA/impending DKA.

## 2022-05-20 NOTE — Clinical Note
"Kwabena Gallagher IV (Fonz) was seen and treated in our emergency department on 5/20/2022.  He may return to work on 05/21/2022.       If you have any questions or concerns, please don't hesitate to call.      MAGDALENA Lau"

## 2022-05-23 ENCOUNTER — PATIENT MESSAGE (OUTPATIENT)
Dept: FAMILY MEDICINE | Facility: CLINIC | Age: 37
End: 2022-05-23
Payer: MEDICAID

## 2022-05-23 ENCOUNTER — TELEPHONE (OUTPATIENT)
Dept: URGENT CARE | Facility: CLINIC | Age: 37
End: 2022-05-23
Payer: MEDICAID

## 2022-05-23 RX ORDER — FLUTICASONE PROPIONATE 50 MCG
2 SPRAY, SUSPENSION (ML) NASAL DAILY
Qty: 18.2 ML | Refills: 1 | Status: SHIPPED | OUTPATIENT
Start: 2022-05-23 | End: 2022-08-10

## 2022-05-23 NOTE — TELEPHONE ENCOUNTER
----- Message from Sarah Vaughn sent at 5/23/2022 10:32 AM CDT -----  Patient came on Friday his meds didn't get to pharmacy . Can you resend Flonase he got to other prescribed thru ER

## 2022-06-09 ENCOUNTER — OFFICE VISIT (OUTPATIENT)
Dept: OTOLARYNGOLOGY | Facility: CLINIC | Age: 37
End: 2022-06-09
Payer: MEDICAID

## 2022-06-09 VITALS
HEART RATE: 88 BPM | WEIGHT: 168.81 LBS | DIASTOLIC BLOOD PRESSURE: 66 MMHG | HEIGHT: 72 IN | SYSTOLIC BLOOD PRESSURE: 99 MMHG | TEMPERATURE: 99 F | BODY MASS INDEX: 22.86 KG/M2

## 2022-06-09 DIAGNOSIS — R42 DIZZINESS: Primary | ICD-10-CM

## 2022-06-09 DIAGNOSIS — H91.90 HEARING LOSS, UNSPECIFIED HEARING LOSS TYPE, UNSPECIFIED LATERALITY: ICD-10-CM

## 2022-06-09 DIAGNOSIS — H93.19 TINNITUS, UNSPECIFIED LATERALITY: ICD-10-CM

## 2022-06-09 PROCEDURE — 1160F RVW MEDS BY RX/DR IN RCRD: CPT | Mod: CPTII,,, | Performed by: NURSE PRACTITIONER

## 2022-06-09 PROCEDURE — 99214 OFFICE O/P EST MOD 30 MIN: CPT | Mod: PBBFAC | Performed by: NURSE PRACTITIONER

## 2022-06-09 PROCEDURE — 3074F PR MOST RECENT SYSTOLIC BLOOD PRESSURE < 130 MM HG: ICD-10-PCS | Mod: CPTII,,, | Performed by: NURSE PRACTITIONER

## 2022-06-09 PROCEDURE — 1159F PR MEDICATION LIST DOCUMENTED IN MEDICAL RECORD: ICD-10-PCS | Mod: CPTII,,, | Performed by: NURSE PRACTITIONER

## 2022-06-09 PROCEDURE — 3008F PR BODY MASS INDEX (BMI) DOCUMENTED: ICD-10-PCS | Mod: CPTII,,, | Performed by: NURSE PRACTITIONER

## 2022-06-09 PROCEDURE — 99203 PR OFFICE/OUTPT VISIT, NEW, LEVL III, 30-44 MIN: ICD-10-PCS | Mod: S$PBB,,, | Performed by: NURSE PRACTITIONER

## 2022-06-09 PROCEDURE — 3078F PR MOST RECENT DIASTOLIC BLOOD PRESSURE < 80 MM HG: ICD-10-PCS | Mod: CPTII,,, | Performed by: NURSE PRACTITIONER

## 2022-06-09 PROCEDURE — 99203 OFFICE O/P NEW LOW 30 MIN: CPT | Mod: S$PBB,,, | Performed by: NURSE PRACTITIONER

## 2022-06-09 PROCEDURE — 1160F PR REVIEW ALL MEDS BY PRESCRIBER/CLIN PHARMACIST DOCUMENTED: ICD-10-PCS | Mod: CPTII,,, | Performed by: NURSE PRACTITIONER

## 2022-06-09 PROCEDURE — 3078F DIAST BP <80 MM HG: CPT | Mod: CPTII,,, | Performed by: NURSE PRACTITIONER

## 2022-06-09 PROCEDURE — 3008F BODY MASS INDEX DOCD: CPT | Mod: CPTII,,, | Performed by: NURSE PRACTITIONER

## 2022-06-09 PROCEDURE — 3074F SYST BP LT 130 MM HG: CPT | Mod: CPTII,,, | Performed by: NURSE PRACTITIONER

## 2022-06-09 PROCEDURE — 1159F MED LIST DOCD IN RCRD: CPT | Mod: CPTII,,, | Performed by: NURSE PRACTITIONER

## 2022-06-09 NOTE — PROGRESS NOTES
[facility]  Otolaryngology Clinic Note    Kwabena Gallagher IV  Encounter Date: 6/9/2022  YOB: 1985    Chief Complaint: dizziness    HPI: 06/09/2022: 36yoM referred for dizziness which began a few weeks ago. He states this began with constant b/l tinnitus x 5 days then the dizziness coincided with it. Dizziness described as a spinning sensation which pulled his eyes to the right and made him drift to the left. Episodes lasted all day long with fluctuating severity. Turning his head to the left exacerbated symptoms as well as heat and sunlight. Denies aural pressure/fullness. He was seen in the ED & placed on meclizine and flonase. Afterward, his symptoms resolved but the tinnitus has returned today. He is unsure if it is unilateral or bilateral. He had a similar occurrence a couple years ago which lasted the whole summer and improved when the weather cooled. He is unsure if he has a hx of recurrent ear infections but he remembers having a set of PE tubes as a child. Denies otalgia, otorrhea, HL, head trauma, or med changes other than meclizine + flonase. He has an appt with his PCP later this month; he believes he may have lupus.     ROS:   General: Negative except per HPI  Skin: Denies rash, ulcer, or lesion.  Eyes: Denies vision changes or diplopia.  Ears: Negative except per HPI  Nose: Negative except per HPI  Throat/mouth: Negative except per HPI  Cardiovascular: Negative except per HPI  Respiratory: Negative except per HPI  Neck: Negative except per HPI  Endocrine: Negative except per HPI  Neurologic: Negative except per HPI    Other 10-point review of systems negative except per HPI      Review of patient's allergies indicates:  No Known Allergies    Past Medical History:   Diagnosis Date    Diabetes mellitus type I        History reviewed. No pertinent surgical history.    Social History     Socioeconomic History    Marital status: Single   Tobacco Use    Smoking  status: Former Smoker     Types: Cigarettes    Smokeless tobacco: Former User   Substance and Sexual Activity    Alcohol use: Never     Alcohol/week: 21.0 standard drinks     Types: 21 Shots of liquor per week    Drug use: No    Sexual activity: Yes       Family History   Problem Relation Age of Onset    Lupus Mother     Rheum arthritis Mother     Lung cancer Father        Outpatient Encounter Medications as of 6/9/2022   Medication Sig Dispense Refill    fluticasone propionate (FLONASE) 50 mcg/actuation nasal spray 2 sprays (100 mcg total) by Each Nostril route once daily. 18.2 mL 1    HUMALOG U-100 INSULIN 100 unit/mL injection Inject into the skin.      insulin aspart U-100 (NOVOLOG) 100 unit/mL injection Inject into the skin 3 (three) times daily before meals.      INSULIN PUMP CARTRIDGE SUBQ Inject into the skin.      meclizine (ANTIVERT) 25 mg tablet Take 1 tablet (25 mg total) by mouth 3 (three) times daily as needed for Dizziness. 20 tablet 0    ONETOUCH ULTRA TEST Strp       urine glucose-ketones test (KETO-DIASTIX) Strp To check as needed for suspicion of DKA       No facility-administered encounter medications on file as of 6/9/2022.       Physical Exam:  Vitals:    06/09/22 1428   BP: 99/66   BP Location: Right arm   Patient Position: Sitting   Pulse: 88   Temp: 99 °F (37.2 °C)   TempSrc: Oral   Weight: 76.6 kg (168 lb 12.8 oz)   Height: 6' (1.829 m)       General: NAD, voice normal  Neuro: AAO, CN II - XII grossly intact  Head/ Face: NCAT, symmetric, sensations intact bilaterally  Eyes: EOMI, PERRL  Ears: externally normal with grossly normal hearing  AD: EAC patent, TM intact, no middle ear effusion, no retractions. Able to autoinsufflate  AS: EAC patent, TM intact, no middle ear effusion, no retractions. Able to autoinsufflate  Nose: bilateral nares patent, midline septum, no rhinorrhea, no external deformity, no turbinate hypertrophy  OC/OP: MMM, no intraoral lesions, no trismus,  dentition is moderate, no uvular deviation, bilaterally symmetric soft palate elevation, palatoglossus and palatopharyngeal fold wnl; tonsils are symmetric and 1+  Indirect laryngoscopy: deferred due to patient intolerance  Neck: soft, supple, no LAD, normal ROM, no thyromegaly  Respiratory: nonlabored, no wheezing, bilateral chest rise  Cardiovascular: RRR  Gastrointestinal: S NT ND  Skin: warm, no lesions  Musculoskeletal: 5/5 strength  Psych: Appropriate affect/mood     Pertinent Data:  ? LABS:  ? AUDIO:            ? CT Scan:   CT Head Without Contrast (Final result)  Result time 05/20/22 13:26:36               Final result by Alec Barrera MD (05/20/22 13:26:36)                               Impression:        No acute intracranial findings.        Electronically signed by:       Alec Barrera  Date:                                                05/20/2022  Time:                                                13:26                         Narrative:     EXAMINATION:  CT HEAD WITHOUT CONTRAST     CLINICAL HISTORY:  vertigo;     TECHNIQUE:  CT imaging of the head performed from the skull base to the vertex without intravenous contrast.  mGycm. Automatic exposure control, adjustment of mA/kV or iterative reconstruction technique was used to reduce radiation.     COMPARISON:  None Available.     FINDINGS:  There is no acute cortical infarct, hemorrhage or mass lesion.  The ventricles are normal in size.     Visualized paranasal sinuses and mastoid air cells are clear.                      Imaging:   I personally reviewed the following images:        Assessment/Plan:  36 y.o. male presents with dizziness.   - Audio referral for baseline + vestibular battery  - Safety precautions  - [place]lew hydration, avoid excess salt & caffeine  - RTC 4-6 weeks following audio. Ok to offer telemed     Evy Cerna NP

## 2022-08-10 ENCOUNTER — OFFICE VISIT (OUTPATIENT)
Dept: INTERNAL MEDICINE | Facility: CLINIC | Age: 37
End: 2022-08-10
Payer: MEDICAID

## 2022-08-10 VITALS
RESPIRATION RATE: 16 BRPM | WEIGHT: 169 LBS | HEIGHT: 72 IN | DIASTOLIC BLOOD PRESSURE: 70 MMHG | HEART RATE: 72 BPM | BODY MASS INDEX: 22.89 KG/M2 | TEMPERATURE: 98 F | SYSTOLIC BLOOD PRESSURE: 124 MMHG

## 2022-08-10 DIAGNOSIS — Z82.69 FAMILY HISTORY OF SYSTEMIC LUPUS ERYTHEMATOSUS: ICD-10-CM

## 2022-08-10 DIAGNOSIS — Z11.4 SCREENING FOR HIV (HUMAN IMMUNODEFICIENCY VIRUS): ICD-10-CM

## 2022-08-10 DIAGNOSIS — Z00.00 WELLNESS EXAMINATION: Primary | ICD-10-CM

## 2022-08-10 DIAGNOSIS — Z11.3 SCREENING EXAMINATION FOR STD (SEXUALLY TRANSMITTED DISEASE): ICD-10-CM

## 2022-08-10 DIAGNOSIS — Z11.59 NEED FOR HEPATITIS C SCREENING TEST: ICD-10-CM

## 2022-08-10 PROBLEM — E10.9 TYPE 1 DIABETES MELLITUS: Status: ACTIVE | Noted: 2022-08-10

## 2022-08-10 PROCEDURE — 1160F PR REVIEW ALL MEDS BY PRESCRIBER/CLIN PHARMACIST DOCUMENTED: ICD-10-PCS | Mod: CPTII,,, | Performed by: NURSE PRACTITIONER

## 2022-08-10 PROCEDURE — 3008F PR BODY MASS INDEX (BMI) DOCUMENTED: ICD-10-PCS | Mod: CPTII,,, | Performed by: NURSE PRACTITIONER

## 2022-08-10 PROCEDURE — 3078F PR MOST RECENT DIASTOLIC BLOOD PRESSURE < 80 MM HG: ICD-10-PCS | Mod: CPTII,,, | Performed by: NURSE PRACTITIONER

## 2022-08-10 PROCEDURE — 3074F SYST BP LT 130 MM HG: CPT | Mod: CPTII,,, | Performed by: NURSE PRACTITIONER

## 2022-08-10 PROCEDURE — 1159F PR MEDICATION LIST DOCUMENTED IN MEDICAL RECORD: ICD-10-PCS | Mod: CPTII,,, | Performed by: NURSE PRACTITIONER

## 2022-08-10 PROCEDURE — 1160F RVW MEDS BY RX/DR IN RCRD: CPT | Mod: CPTII,,, | Performed by: NURSE PRACTITIONER

## 2022-08-10 PROCEDURE — 3008F BODY MASS INDEX DOCD: CPT | Mod: CPTII,,, | Performed by: NURSE PRACTITIONER

## 2022-08-10 PROCEDURE — 3078F DIAST BP <80 MM HG: CPT | Mod: CPTII,,, | Performed by: NURSE PRACTITIONER

## 2022-08-10 PROCEDURE — 1159F MED LIST DOCD IN RCRD: CPT | Mod: CPTII,,, | Performed by: NURSE PRACTITIONER

## 2022-08-10 PROCEDURE — 99395 PR PREVENTIVE VISIT,EST,18-39: ICD-10-PCS | Mod: S$PBB,,, | Performed by: NURSE PRACTITIONER

## 2022-08-10 PROCEDURE — 99395 PREV VISIT EST AGE 18-39: CPT | Mod: S$PBB,,, | Performed by: NURSE PRACTITIONER

## 2022-08-10 PROCEDURE — 99214 OFFICE O/P EST MOD 30 MIN: CPT | Mod: PBBFAC | Performed by: NURSE PRACTITIONER

## 2022-08-10 PROCEDURE — 3074F PR MOST RECENT SYSTOLIC BLOOD PRESSURE < 130 MM HG: ICD-10-PCS | Mod: CPTII,,, | Performed by: NURSE PRACTITIONER

## 2022-08-10 RX ORDER — INSULIN LISPRO 100 [IU]/ML
50 INJECTION, SOLUTION INTRAVENOUS; SUBCUTANEOUS
COMMUNITY
Start: 2022-05-16

## 2022-08-10 NOTE — PROGRESS NOTES
AMMON Leung   [facility]  [address] [facility] [address]      PATIENT NAME: Kwabena Gallagher IV  : 1985  DATE: 8/10/22  MRN: [id number]      Billing Provider: AMMON Leung  Level of Service: [facility] PREVENTIVE VISIT,NEW,18-39  Patient PCP Information     Provider PCP Type    Lesa Young, [place], CRNA General          Reason for Visit / Chief Complaint: Establish Care (Needs PCP)       History of Present Illness / Problem Focused Workflow     Kwabena Gallagher IV presents to the clinic with Establish Care (Needs PCP)     37 yo male here today to establish care. PMH T1DM, tinnitus. Followed by [facility] & [facility].     Today's Visit:  Pt reports today for routine wellness and also with requests for testing for Lupus; pt recently seen in the ENT clinic - orders for an audiology test and then a 4-6 week f/u, the pt reports that he did not f/u or complete the audio because he does not think that vertigo is the issues, is very concerned with Lupus. Pt reports that his mother has SLE, both him and his sister feel they have a lot of the symptoms. Pt reports he has the classic butterfly rash on his face (noticily) reports its gotten worse, symptoms of sensitivity, to heat and light, acute vertigo (started in 2017). He reports he has symptoms that are noticeable when he is in sun light. The pt is agreeable to Wellness labs along with autoimmune work up today .Will f/u next week via audio virtual to discuss the labs. Denies any other issues at this time.   Denies chest pain, shortness of breath, cough, fever, headache, dizziness, weakness, abdominal pain, nausea, vomiting, diarrhea, constipation, black/bloody stools, unplanned weight loss, night sweats, changes in urinary patterns, burning/odor with urination, depression, anxiety, and SI/HI.         Review of Systems     Review of Systems    Medical / Social /  Family History     Past Medical History:   Diagnosis Date    Diabetes mellitus type I     Vertigo 2017       History reviewed. No pertinent surgical history.    Social History    reports that he has quit smoking. His smoking use included cigarettes. He has quit using smokeless tobacco. He reports that he does not drink alcohol and does not use drugs.    Family History  's family history includes Lung cancer in his father; Lupus in his mother; Rheum arthritis in his mother.    Medications and Allergies     Medications  Medication List with Changes/Refills   Current Medications    INSULIN LISPRO 100 UNIT/ML INJECTION    Inject 50 Units into the skin.    INSULIN PUMP CARTRIDGE SUBQ    Inject into the skin.    MECLIZINE (ANTIVERT) 25 MG TABLET    Take 1 tablet (25 mg total) by mouth 3 (three) times daily as needed for Dizziness.    ONETOUCH ULTRA TEST STRP       Discontinued Medications    FLUTICASONE PROPIONATE (FLONASE) 50 MCG/ACTUATION NASAL SPRAY    2 sprays (100 mcg total) by Each Nostril route once daily.    HUMALOG U-100 INSULIN 100 UNIT/ML INJECTION    Inject into the skin.    INSULIN ASPART U-100 (NOVOLOG) 100 UNIT/ML INJECTION    Inject into the skin 3 (three) times daily before meals.    URINE GLUCOSE-KETONES TEST (KETO-DIASTIX) STRP    To check as needed for suspicion of DKA        Allergies  Review of patient's allergies indicates:  No Known Allergies    Physical Examination     Vitals:    08/10/22 0819   BP: 124/70   Pulse: 72   Resp: 16   Temp: 98.2 °F (36.8 °C)     Physical Exam  Vitals reviewed.   Constitutional:       Appearance: Normal appearance. He is normal weight.   HENT:      Head: Normocephalic.   Cardiovascular:      Rate and Rhythm: Normal rate and regular rhythm.      Pulses: Normal pulses.      Heart sounds: Normal heart sounds.   Pulmonary:      Effort: Pulmonary effort is normal.      Breath sounds: Normal breath sounds.   Abdominal:      General: Abdomen is flat.      Palpations:  Abdomen is soft.   Musculoskeletal:         General: Normal range of motion.      Cervical back: Normal range of motion.   Skin:     General: Skin is warm and dry.      Findings: Rash present. Rash is macular.      Comments: Red, Butterfly, shaped rash to face - see Clinical Photo    Neurological:      Mental Status: He is alert.   Psychiatric:         Mood and Affect: Mood normal.       Media Information                          Results         Assessment and Plan (including Health Maintenance)     Plan:         Health Maintenance Due   Topic Date Due    Hepatitis C Screening  Never done    Lipid Panel  Never done    HIV Screening  Never done    COVID-19 Vaccine (3 - Booster for Pfizer series) 10/02/2021       Problem List Items Addressed This Visit    None     Visit Diagnoses     Wellness examination    -  Primary    Relevant Orders    CBC Auto Differential    Comprehensive Metabolic Panel    Lipid Panel    TSH    T4, Free    Vitamin D    Hemoglobin    Urinalysis, Reflex to Urine Culture Urine, Clean Catch    PSA, Screening    HIV 1/2 Ag/Ab (4th Gen)    Chlamydia/GC, PCR    HSV 1 & 2, IgG    SYPHILIS ANTIBODY (WITH REFLEX RPR)    Screening examination for STD (sexually transmitted disease)        Relevant Orders    Chlamydia/GC, PCR    HSV 1 & 2, IgG    SYPHILIS ANTIBODY (WITH REFLEX RPR)    Hepatitis Panel, Acute    Screening for HIV (human immunodeficiency virus)        Relevant Orders    HIV 1/2 Ag/Ab (4th Gen)    Need for hepatitis C screening test        Family history of systemic lupus erythematosus        Relevant Orders    Antinuclear Antibody (LUIS), HEp-2, IgG    RHEUMATOID ARTHRITIS PANEL    C-Reactive Protein    Sedimentation rate    CK    Hepatitis Panel, Acute          Health Maintenance Topics with due status: Not Due       Topic Last Completion Date    TETANUS VACCINE 07/26/2016    Influenza Vaccine 08/21/2019       Future Appointments   Date Time Provider Department Center   8/18/2022  7:15 AM  AMMON Leung [facility]            Signature:     [facility]  [address] [facility] [address]    Date of encounter: 8/10/22

## 2022-08-18 ENCOUNTER — OFFICE VISIT (OUTPATIENT)
Dept: INTERNAL MEDICINE | Facility: CLINIC | Age: 37
End: 2022-08-18
Payer: MEDICAID

## 2022-08-18 DIAGNOSIS — E55.9 VITAMIN D DEFICIENCY: ICD-10-CM

## 2022-08-18 DIAGNOSIS — Z84.0 FAMILY HISTORY OF CUTANEOUS LUPUS ERYTHEMATOSUS: Primary | ICD-10-CM

## 2022-08-18 DIAGNOSIS — Z00.00 WELLNESS EXAMINATION: ICD-10-CM

## 2022-08-18 PROCEDURE — 1159F MED LIST DOCD IN RCRD: CPT | Mod: CPTII,95,, | Performed by: NURSE PRACTITIONER

## 2022-08-18 PROCEDURE — 99214 OFFICE O/P EST MOD 30 MIN: CPT | Mod: 95,,, | Performed by: NURSE PRACTITIONER

## 2022-08-18 PROCEDURE — 1160F RVW MEDS BY RX/DR IN RCRD: CPT | Mod: CPTII,95,, | Performed by: NURSE PRACTITIONER

## 2022-08-18 PROCEDURE — 1159F PR MEDICATION LIST DOCUMENTED IN MEDICAL RECORD: ICD-10-PCS | Mod: CPTII,95,, | Performed by: NURSE PRACTITIONER

## 2022-08-18 PROCEDURE — 99214 PR OFFICE/OUTPT VISIT, EST, LEVL IV, 30-39 MIN: ICD-10-PCS | Mod: 95,,, | Performed by: NURSE PRACTITIONER

## 2022-08-18 PROCEDURE — 1160F PR REVIEW ALL MEDS BY PRESCRIBER/CLIN PHARMACIST DOCUMENTED: ICD-10-PCS | Mod: CPTII,95,, | Performed by: NURSE PRACTITIONER

## 2022-08-18 RX ORDER — ERGOCALCIFEROL 1.25 MG/1
50000 CAPSULE ORAL
Qty: 8 CAPSULE | Refills: 0 | Status: SHIPPED | OUTPATIENT
Start: 2022-08-18 | End: 2023-04-19

## 2022-08-18 NOTE — PROGRESS NOTES
AMMON Leung   [facility]  [address] [facility] [address]      PATIENT NAME: Kwabena Gallagher IV  : 1985  DATE: 22  MRN: [id number]      Billing Provider: AMMON Leung  Level of Service: [place] OFFICE/OUTPT VISIT, EST, LEVL IV, 30-39 MIN  Patient PCP Information     Provider PCP Type    AMMON Leung General          Reason for Visit / Chief Complaint: Follow-up (LAB REVIEW)       History of Present Illness / Problem Focused Workflow     Kwabena Gallagher IV presents to the clinic with Follow-up (LAB REVIEW)     37 yo male here today via virtual audio. PMH T1DM, tinnitus. Followed by [facility] & [facility].     08/10/22  Pt reports today for routine wellness and also with requests for testing for Lupus; pt recently seen in the ENT clinic - orders for an audiology test and then a 4-6 week f/u, the pt reports that he did not f/u or complete the audio because he does not think that vertigo is the issues, is very concerned with Lupus. Pt reports that his mother has SLE, both him and his sister feel they have a lot of the symptoms. Pt reports he has the classic butterfly rash on his face (noticily) reports its gotten worse, symptoms of sensitivity, to heat and light, acute vertigo (started in 2017). He reports he has symptoms that are noticeable when he is in sun light. The pt is agreeable to Wellness labs along with autoimmune work up today .Will f/u next week via audio virtual to discuss the labs. Denies any other issues at this time.     22  Pt reports today for lab review. Labs discussed with patient today including all the WNL Autoimmune type labs. The pt is still agreeable to referral to Rheum for further evaluation due to family hx of SLE and pt symptom concerns. Low Vitamin D discussed ,RX sent to pharmacy, pt does report he is not a fan of Vit D as he feels it makes him tired but we did  discuss that low Vitamin D can cause tiredness, verbalized understanding. Pt is agreeable to 1 year wellness f/u; is aware of upcoming OV next week with Endo, I informed patient he will be able to see my note / labs / & referral through Epic. Pt denies any other acute issues or concerns today, he is agreeable to 1 year Wellness f/u and prn.     Follow-up        Review of Systems     Review of Systems   Constitutional: Negative.    HENT: Negative.    Eyes: Negative.    Respiratory: Negative.    Cardiovascular: Negative.    Gastrointestinal: Negative.    Endocrine: Negative.    Genitourinary: Negative.    Neurological: Negative.    Psychiatric/Behavioral: Negative.        Medical / Social / Family History     Past Medical History:   Diagnosis Date    Diabetes mellitus type I     Vertigo 2017       History reviewed. No pertinent surgical history.    Social History    reports that he has quit smoking. His smoking use included cigarettes. He has quit using smokeless tobacco. He reports that he does not drink alcohol and does not use drugs.    Family History  's family history includes Lung cancer in his father; Lupus in his mother; Rheum arthritis in his mother.    Medications and Allergies     Medications  Medication List with Changes/Refills   New Medications    ERGOCALCIFEROL (ERGOCALCIFEROL) 50,000 UNIT CAP    Take 1 capsule (50,000 Units total) by mouth every 7 days.   Current Medications    INSULIN LISPRO 100 UNIT/ML INJECTION    Inject 50 Units into the skin.    INSULIN PUMP CARTRIDGE SUBQ    Inject into the skin.    MECLIZINE (ANTIVERT) 25 MG TABLET    Take 1 tablet (25 mg total) by mouth 3 (three) times daily as needed for Dizziness.    ONETOUCH ULTRA TEST STRP            Allergies  Review of patient's allergies indicates:  No Known Allergies    Physical Examination   There were no vitals filed for this visit.  Physical Exam  HENT:      Right Ear: Hearing normal.      Left Ear: Hearing normal.    Neurological:      Mental Status: He is alert and oriented to person, place, and time.   Psychiatric:         Mood and Affect: Mood normal.           Results     Lab Results   Component Value Date    WBC 5.8 08/10/2022    RBC 5.23 08/10/2022    HGB 15.2 08/10/2022    HGB 15.4 08/10/2022    HCT 46.3 08/10/2022    MCV 88.5 08/10/2022    MCH 29.4 08/10/2022    MCHC 33.3 08/10/2022    RDW 11.9 08/10/2022     08/10/2022    MPV 10.2 08/10/2022    GRAN 3.1 01/05/2019    GRAN 47.9 01/05/2019    LYMPH 1.7 01/05/2019    LYMPH 26.2 01/05/2019    MONO 1.3 (H) 01/05/2019    MONO 19.9 (H) 01/05/2019    EOS 0.3 01/05/2019    BASO 0.06 01/05/2019    EOSINOPHIL 4.5 01/05/2019    BASOPHIL 0.9 01/05/2019     CMP  Sodium   Date Value Ref Range Status   01/05/2019 138 136 - 145 mmol/L Final     Sodium Level   Date Value Ref Range Status   08/10/2022 138 136 - 145 mmol/L Final     Potassium   Date Value Ref Range Status   01/05/2019 4.0 3.5 - 5.1 mmol/L Final     Potassium Level   Date Value Ref Range Status   08/10/2022 3.9 3.5 - 5.1 mmol/L Final     Chloride   Date Value Ref Range Status   01/05/2019 104 95 - 110 mmol/L Final     CO2   Date Value Ref Range Status   01/05/2019 26 23 - 29 mmol/L Final     Carbon Dioxide   Date Value Ref Range Status   08/10/2022 26 22 - 29 mmol/L Final     Glucose   Date Value Ref Range Status   01/05/2019 215 (H) 70 - 110 mg/dL Final     BUN   Date Value Ref Range Status   01/05/2019 11 6 - 20 mg/dL Final     Blood Urea Nitrogen   Date Value Ref Range Status   08/10/2022 15.4 8.9 - 20.6 mg/dL Final     Creatinine   Date Value Ref Range Status   08/10/2022 0.83 0.73 - 1.18 mg/dL Final   01/05/2019 0.9 0.5 - 1.4 mg/dL Final     Calcium   Date Value Ref Range Status   01/05/2019 9.5 8.7 - 10.5 mg/dL Final     Calcium Level Total   Date Value Ref Range Status   08/10/2022 9.6 8.4 - 10.2 mg/dL Final     Total Protein   Date Value Ref Range Status   01/05/2019 7.1 6.0 - 8.4 g/dL Final     Albumin    Date Value Ref Range Status   01/05/2019 3.6 3.5 - 5.2 g/dL Final     Albumin Level   Date Value Ref Range Status   08/10/2022 4.3 3.5 - 5.0 gm/dL Final     Total Bilirubin   Date Value Ref Range Status   01/05/2019 0.3 0.1 - 1.0 mg/dL Final     Comment:     For infants and newborns, interpretation of results should be based  on gestational age, weight and in agreement with clinical  observations.  Premature Infant recommended reference ranges:  Up to 24 hours.............<8.0 mg/dL  Up to 48 hours............<12.0 mg/dL  3-5 days..................<15.0 mg/dL  6-29 days.................<15.0 mg/dL       Bilirubin Total   Date Value Ref Range Status   08/10/2022 0.7 <=1.5 mg/dL Final     Alkaline Phosphatase   Date Value Ref Range Status   08/10/2022 67 40 - 150 unit/L Final   01/05/2019 103 55 - 135 U/L Final     AST   Date Value Ref Range Status   01/05/2019 33 10 - 40 U/L Final     Aspartate Aminotransferase   Date Value Ref Range Status   08/10/2022 24 5 - 34 unit/L Final     ALT   Date Value Ref Range Status   01/05/2019 60 (H) 10 - 44 U/L Final     Alanine Aminotransferase   Date Value Ref Range Status   08/10/2022 28 0 - 55 unit/L Final     Anion Gap   Date Value Ref Range Status   01/05/2019 8 8 - 16 mmol/L Final     eGFR if    Date Value Ref Range Status   01/05/2019 >60.0 >60 mL/min/1.73 m^2 Final     eGFR if non    Date Value Ref Range Status   01/05/2019 >60.0 >60 mL/min/1.73 m^2 Final     Comment:     Calculation used to obtain the estimated glomerular filtration  rate (eGFR) is the CKD-EPI equation.        Estimated GFR-Non    Date Value Ref Range Status   05/20/2022 >60 mls/min/1.73/m2 Final     Lab Results   Component Value Date    CHOL 175 08/10/2022     Lab Results   Component Value Date    HDL 68 (H) 08/10/2022     No results found for: LDLCALC  Lab Results   Component Value Date    TRIG 42 08/10/2022     No results found for: CHOLHDL  Lab Results    Component Value Date    TSH 1.2442 08/10/2022     Lab Results   Component Value Date    PHUR 7.0 05/20/2022    SPECGRAV 1.005 01/05/2019    PROTEINUA Negative 08/10/2022    GLUCUA 3+ (A) 01/05/2019    KETONESU Negative 01/05/2019    OCCULTUA Negative 01/05/2019    NITRITE Negative 01/05/2019    LEUKOCYTESUR Negative 08/10/2022           Assessment and Plan (including Health Maintenance)     Plan:         Health Maintenance Due   Topic Date Due    COVID-19 Vaccine (3 - Booster for Pfizer series) 10/02/2021       Problem List Items Addressed This Visit        Derm    Family history of cutaneous lupus erythematosus - Primary    Current Assessment & Plan     Referral to Rheumatology Clinic for further Eval.            Relevant Orders    Ambulatory referral/consult to Rheumatology       Endocrine    Vitamin D deficiency    Current Assessment & Plan     Educated on increasing foods high in Vitamin D such as fish oil, cod liver oil, salmon, milk fortified with vitamin D.  RX Vitamin D3 51869 IU weekly x 12 weeks.  Complete entire 12 weeks of Vitamin D prescription.  After completion of prescription (12 weeks/3 months), begin taking Vitamin D 2000 I.U. tablets daily (purchase over the counter).  Repeat Vitamin D level as ordered.             Relevant Medications    ergocalciferol (ERGOCALCIFEROL) 50,000 unit Cap      Other Visit Diagnoses     Wellness examination        Relevant Orders    CBC Auto Differential    Comprehensive Metabolic Panel    Lipid Panel    TSH    T4, Free    Vitamin D    Urinalysis, Reflex to Urine Culture Urine, Clean Catch    Hemoglobin A1C          Health Maintenance Topics with due status: Not Due       Topic Last Completion Date    TETANUS VACCINE 07/26/2016    Influenza Vaccine 08/21/2019       No future appointments.         Signature:     [facility]  [address] [facility] [address]    Date of encounter: 8/18/22    Established  Patient - Audio Only Telehealth Visit     The patient location is: home  The chief complaint leading to consultation is: lab review  Visit type: Virtual visit with audio only (telephone)  Total time spent with patient: 15mins       The reason for the audio only service rather than synchronous audio and video virtual visit was related to technical difficulties or patient preference/necessity.     Each patient to whom I provide medical services by telemedicine is:  (1) informed of the relationship between the physician and patient and the respective role of any other health care provider with respect to management of the patient; and (2) notified that they may decline to receive medical services by telemedicine and may withdraw from such care at any time. Patient verbally consented to receive this service via voice-only telephone call.       This service was not originating from a related E/M service provided within the previous 7 days nor will  to an E/M service or procedure within the next 24 hours or my soonest available appointment.  Prevailing standard of care was able to be met in this audio-only visit.

## 2022-08-18 NOTE — ASSESSMENT & PLAN NOTE
Educated on increasing foods high in Vitamin D such as fish oil, cod liver oil, salmon, milk fortified with vitamin D.  RX Vitamin D3 37244 IU weekly x 12 weeks.  Complete entire 12 weeks of Vitamin D prescription.  After completion of prescription (12 weeks/3 months), begin taking Vitamin D 2000 I.U. tablets daily (purchase over the counter).  Repeat Vitamin D level as ordered.

## 2023-03-16 ENCOUNTER — OFFICE VISIT (OUTPATIENT)
Dept: URGENT CARE | Facility: CLINIC | Age: 38
End: 2023-03-16
Payer: MEDICAID

## 2023-03-16 VITALS
BODY MASS INDEX: 23.14 KG/M2 | RESPIRATION RATE: 18 BRPM | DIASTOLIC BLOOD PRESSURE: 85 MMHG | SYSTOLIC BLOOD PRESSURE: 127 MMHG | HEIGHT: 72 IN | OXYGEN SATURATION: 98 % | HEART RATE: 81 BPM | WEIGHT: 170.88 LBS | TEMPERATURE: 98 F

## 2023-03-16 DIAGNOSIS — S01.00XA OPEN WOUND OF SCALP, UNSPECIFIED OPEN WOUND TYPE, INITIAL ENCOUNTER: Primary | ICD-10-CM

## 2023-03-16 PROCEDURE — 99213 PR OFFICE/OUTPT VISIT, EST, LEVL III, 20-29 MIN: ICD-10-PCS | Mod: S$PBB,,, | Performed by: NURSE PRACTITIONER

## 2023-03-16 PROCEDURE — 99214 OFFICE O/P EST MOD 30 MIN: CPT | Mod: PBBFAC | Performed by: NURSE PRACTITIONER

## 2023-03-16 PROCEDURE — 99213 OFFICE O/P EST LOW 20 MIN: CPT | Mod: S$PBB,,, | Performed by: NURSE PRACTITIONER

## 2023-03-16 PROCEDURE — 87070 CULTURE OTHR SPECIMN AEROBIC: CPT | Performed by: NURSE PRACTITIONER

## 2023-03-16 RX ORDER — DICLOFENAC SODIUM 75 MG/1
75 TABLET, DELAYED RELEASE ORAL 2 TIMES DAILY
Qty: 28 TABLET | Refills: 0 | Status: SHIPPED | OUTPATIENT
Start: 2023-03-16 | End: 2023-04-19 | Stop reason: ALTCHOICE

## 2023-03-16 RX ORDER — SULFAMETHOXAZOLE AND TRIMETHOPRIM 800; 160 MG/1; MG/1
1 TABLET ORAL 2 TIMES DAILY
Qty: 20 TABLET | Refills: 0 | Status: SHIPPED | OUTPATIENT
Start: 2023-03-16 | End: 2023-04-19 | Stop reason: ALTCHOICE

## 2023-03-16 NOTE — PATIENT INSTRUCTIONS
Wash hair and scalp daily with [employer] Baby Shampoo.  Use only the fingerpads to wash scalp and hair, do not scratch with fingernails.  Start antibiotic today.  Wound Culture pending, If a change in Rx medications are needed for any reason after results are received, they will automatically be e-prescribed to the pharmacy you chose today.  If after starting the antibiotic today, you get worse in the next few days with fever, chills, body aches, increased drainage or wound odor, return to this Urgent Care for a recheck.

## 2023-03-16 NOTE — PROGRESS NOTES
"Subjective:       Patient ID: Kwabena Gallagher IV is a 37 y.o. male.    Vitals:  height is 6' 0.05" (1.83 m) and weight is 77.5 kg (170 lb 13.7 oz). His temperature is 98.1 °F (36.7 °C). His blood pressure is 127/85 and his pulse is 81. His respiration is 18 and oxygen saturation is 98%.     Chief Complaint: Otalgia (Rt side x 1wk) and head lesion (X 2days)    HPI 2 days ago popped a sore on back of head in shower, now painful, draining, has lump on back of neck/head. No fevers but feels tonsils are swollen. T1DM, insulin pump. States Glucose in 200s.  ROS    Objective:      Physical Exam   Constitutional: He is oriented to person, place, and time. He does not appear ill. normal  HENT:   Head:       Mouth/Throat: No oropharyngeal exudate or posterior oropharyngeal erythema.   Neck:       Pulmonary/Chest: Effort normal.   Abdominal: Normal appearance.   Musculoskeletal:      Cervical back: He exhibits tenderness.   Lymphadenopathy:     He has cervical adenopathy.   Neurological: He is alert and oriented to person, place, and time.   Skin: lesion   Psychiatric: His behavior is normal. Mood, judgment and thought content normal.   Vitals reviewed.      Assessment:       1. Open wound of scalp, unspecified open wound type, initial encounter            Plan:         Open wound of scalp, unspecified open wound type, initial encounter  -     Wound Culture    Other orders  -     sulfamethoxazole-trimethoprim 800-160mg (BACTRIM DS) 800-160 mg Tab; Take 1 tablet by mouth 2 (two) times daily. for 10 days  Dispense: 20 tablet; Refill: 0  -     diclofenac (VOLTAREN) 75 MG EC tablet; Take 1 tablet (75 mg total) by mouth 2 (two) times daily. With food, if needed for pain. Do not combine with ibuprofen, naproxen or other NSAIDs. for 14 days  Dispense: 28 tablet; Refill: 0            Wash hair and scalp daily with [employer] Baby Shampoo.  Use only the fingerpads to wash scalp and hair, do not scratch with " fingernails.  Start antibiotic today.  Wound Culture pending, If a change in Rx medications are needed for any reason after results are received, they will automatically be e-prescribed to the pharmacy you chose today.  If after starting the antibiotic today, you get worse in the next few days with fever, chills, body aches, increased drainage or wound odor, return to this Urgent Care for a recheck.

## 2023-03-18 LAB — BACTERIA SPEC CULT: ABNORMAL

## 2023-03-20 ENCOUNTER — TELEPHONE (OUTPATIENT)
Dept: URGENT CARE | Facility: CLINIC | Age: 38
End: 2023-03-20
Payer: MEDICAID

## 2023-03-20 NOTE — TELEPHONE ENCOUNTER
----- Message from Leilani Kerns NP sent at 3/20/2023 11:11 AM CDT -----  Sensitive to bactrim, no need to change antibiotics.

## 2023-04-02 ENCOUNTER — OFFICE VISIT (OUTPATIENT)
Dept: URGENT CARE | Facility: CLINIC | Age: 38
End: 2023-04-02
Payer: MEDICAID

## 2023-04-02 VITALS
WEIGHT: 169 LBS | SYSTOLIC BLOOD PRESSURE: 102 MMHG | RESPIRATION RATE: 18 BRPM | TEMPERATURE: 99 F | BODY MASS INDEX: 23.66 KG/M2 | OXYGEN SATURATION: 98 % | HEIGHT: 71 IN | HEART RATE: 97 BPM | DIASTOLIC BLOOD PRESSURE: 67 MMHG

## 2023-04-02 DIAGNOSIS — Z51.89 VISIT FOR WOUND CHECK: Primary | ICD-10-CM

## 2023-04-02 PROCEDURE — 99213 OFFICE O/P EST LOW 20 MIN: CPT | Mod: S$PBB,,, | Performed by: FAMILY MEDICINE

## 2023-04-02 PROCEDURE — 99213 PR OFFICE/OUTPT VISIT, EST, LEVL III, 20-29 MIN: ICD-10-PCS | Mod: S$PBB,,, | Performed by: FAMILY MEDICINE

## 2023-04-02 PROCEDURE — 99214 OFFICE O/P EST MOD 30 MIN: CPT | Mod: PBBFAC | Performed by: FAMILY MEDICINE

## 2023-04-02 NOTE — PROGRESS NOTES
"Subjective:      Patient ID: Kwabena Gallagher IV is a 37 y.o. male.    Vitals:  height is 5' 11.26" (1.81 m) and weight is 76.7 kg (169 lb). His oral temperature is 98.6 °F (37 °C). His blood pressure is 102/67 and his pulse is 97. His respiration is 18 and oxygen saturation is 98%.     Chief Complaint: Wound Check (Wound to scalp, pain radiating to R ear and R jaw)    Patient returns to urgent care for wound check.  Had wound on the right posterior scalp 2-3 weeks ago.  Was seen in urgent care by another provider, culture showed MSSA, completed a course of antibiotics.  Wanted this area checked.  Still causes occasional discomfort.  No drainage.  Occasional pain in right ear and right jaw.  No difficulty swallowing.  No fever.  No other lesions.  He has a history of type 1 diabetes which has been controlled.  Interestingly, he has a family history of SLE    Wound Check      Constitution: Negative for fever.    Objective:     Physical Exam   Constitutional: He appears well-developed.  Non-toxic appearance. He does not appear ill. No distress.   HENT:   Head: Atraumatic.       Ears:   Right Ear: Tympanic membrane, external ear and ear canal normal. No swelling or tenderness. No mastoid tenderness.   Left Ear: Tympanic membrane and ear canal normal.   Mouth/Throat: Oropharynx is clear.   Eyes: Conjunctivae are normal.   Abdominal: Soft. There is no abdominal tenderness.   Lymphadenopathy:     He has no cervical adenopathy.   Skin: Skin is warm, dry, not diaphoretic and no rash. Capillary refill takes less than 2 seconds.     Assessment:     1. Visit for wound check        Plan:       Visit for wound check      Discussed potential etiology of scalp wound.  This could be slow healing because of underlying comorbidities.  But we had a discussion of his family history of SLE, potential for discoid lupus.  He has a rheumatology appointment upcoming.  I encouraged him to monitor this area, avoid manipulating it.  " Mention this to the rheumatologist and his PCP to get it rechecked.  I would be happy to see him in urgent care if needed.

## 2023-04-19 ENCOUNTER — OFFICE VISIT (OUTPATIENT)
Dept: RHEUMATOLOGY | Facility: CLINIC | Age: 38
End: 2023-04-19
Payer: MEDICAID

## 2023-04-19 VITALS
SYSTOLIC BLOOD PRESSURE: 105 MMHG | WEIGHT: 169 LBS | HEART RATE: 74 BPM | TEMPERATURE: 98 F | OXYGEN SATURATION: 97 % | DIASTOLIC BLOOD PRESSURE: 66 MMHG | RESPIRATION RATE: 16 BRPM | HEIGHT: 71 IN | BODY MASS INDEX: 23.66 KG/M2

## 2023-04-19 DIAGNOSIS — Z82.69 FAMILY HISTORY OF SYSTEMIC LUPUS ERYTHEMATOSUS: Primary | ICD-10-CM

## 2023-04-19 DIAGNOSIS — M25.50 ARTHRALGIA, UNSPECIFIED JOINT: ICD-10-CM

## 2023-04-19 DIAGNOSIS — E10.9 TYPE 1 DIABETES MELLITUS WITHOUT COMPLICATION: ICD-10-CM

## 2023-04-19 DIAGNOSIS — R42 DIZZINESS: ICD-10-CM

## 2023-04-19 DIAGNOSIS — R21 RASH OF FACE: ICD-10-CM

## 2023-04-19 PROCEDURE — 3074F SYST BP LT 130 MM HG: CPT | Mod: CPTII,,, | Performed by: INTERNAL MEDICINE

## 2023-04-19 PROCEDURE — 3008F PR BODY MASS INDEX (BMI) DOCUMENTED: ICD-10-PCS | Mod: CPTII,,, | Performed by: INTERNAL MEDICINE

## 2023-04-19 PROCEDURE — 1159F PR MEDICATION LIST DOCUMENTED IN MEDICAL RECORD: ICD-10-PCS | Mod: CPTII,,, | Performed by: INTERNAL MEDICINE

## 2023-04-19 PROCEDURE — 99214 OFFICE O/P EST MOD 30 MIN: CPT | Mod: PBBFAC | Performed by: INTERNAL MEDICINE

## 2023-04-19 PROCEDURE — 3078F DIAST BP <80 MM HG: CPT | Mod: CPTII,,, | Performed by: INTERNAL MEDICINE

## 2023-04-19 PROCEDURE — 3008F BODY MASS INDEX DOCD: CPT | Mod: CPTII,,, | Performed by: INTERNAL MEDICINE

## 2023-04-19 PROCEDURE — 1159F MED LIST DOCD IN RCRD: CPT | Mod: CPTII,,, | Performed by: INTERNAL MEDICINE

## 2023-04-19 PROCEDURE — 99205 PR OFFICE/OUTPT VISIT, NEW, LEVL V, 60-74 MIN: ICD-10-PCS | Mod: S$PBB,,, | Performed by: INTERNAL MEDICINE

## 2023-04-19 PROCEDURE — 3074F PR MOST RECENT SYSTOLIC BLOOD PRESSURE < 130 MM HG: ICD-10-PCS | Mod: CPTII,,, | Performed by: INTERNAL MEDICINE

## 2023-04-19 PROCEDURE — 99205 OFFICE O/P NEW HI 60 MIN: CPT | Mod: S$PBB,,, | Performed by: INTERNAL MEDICINE

## 2023-04-19 PROCEDURE — 3078F PR MOST RECENT DIASTOLIC BLOOD PRESSURE < 80 MM HG: ICD-10-PCS | Mod: CPTII,,, | Performed by: INTERNAL MEDICINE

## 2023-04-19 NOTE — PROGRESS NOTES
Patient ID: [id number]     Chief Complaint: Disease Management (Referral for Disease management. )      Referred By: Elin Paitño     HPI:     Kwabena Gallagher IV is a 37 y.o. male here today for a new patient visit.      C/o generalized pain since he was diagnosed with DM and was admitted for DKA when he was 26 years old. Generalized pain is intermittent, no known aggravating factors. Pain in shoulders, elbows and back. Denies red, warm and swollen joints. Morning stiffness for 1-2 hours, all over body. He does not do exercise.  He has 10 month old daughter and stays active with her.   Sleep is poor, worse with 10 month old and admits fatigue.  He is in IT now, used to work in kitchen in the past.   He had a pimple on scalp, 3/10/23 popped it and developed staph infection, completed antibiotics, wound is healing well.   Admits intermittent vertigo, had seen ENT, work up was negative and he takes meclizine as needed.   Admits rash on face, worse in summer and when he takes hot shower. Can not tell how long it lasts or what are the triggers.     Denies history of fevers, photosensitivity, oral or nasal ulcers, h/o MI, stroke, seizures, h/o PE or DVT, Raynaud's phenomenon, uveitis, malignancies.   Family history of autoimmune disease: Mother has lupus.  Smoking: smokes marijuana. Stopped cigarettes in 2019.     Social History     Tobacco Use   Smoking Status Former    Types: Cigarettes   Smokeless Tobacco Former          ----------------------------  Diabetes mellitus type I  Vertigo     Past Surgical History:   Procedure Laterality Date    NO PAST SURGERIES         Review of patient's allergies indicates:  No Known Allergies    Outpatient Medications Marked as Taking for the 4/19/23 encounter (Office Visit) with Mireille Taylor MD   Medication Sig Dispense Refill    insulin lispro 100 unit/mL injection Inject 50 Units into the skin.      INSULIN PUMP CARTRIDGE SUBQ Inject into the skin.      ONETOUCH ULTRA TEST  Strp       [DISCONTINUED] ergocalciferol (ERGOCALCIFEROL) 50,000 unit Cap Take 1 capsule (50,000 Units total) by mouth every 7 days. 8 capsule 0    [DISCONTINUED] meclizine (ANTIVERT) 25 mg tablet Take 1 tablet (25 mg total) by mouth 3 (three) times daily as needed for Dizziness. 20 tablet 0       Social History     Socioeconomic History    Marital status: Single   Tobacco Use    Smoking status: Former     Types: Cigarettes    Smokeless tobacco: Former   Substance and Sexual Activity    Alcohol use: Never     Alcohol/week: 21.0 standard drinks     Types: 21 Shots of liquor per week    Drug use: Yes     Types: Marijuana    Sexual activity: Yes        Family History   Problem Relation Age of Onset    Lupus Mother     Rheum arthritis Mother     Lung cancer Father         Immunization History   Administered Date(s) Administered    COVID-19, MRNA, LN-S, PF (Pfizer) (Purple Cap) 04/11/2021, 05/02/2021    DTP 04/13/1989, 10/26/1989, 01/02/1990, 08/01/1990, 08/16/1994    HIB 10/26/1989    Influenza 01/06/2014    Influenza - Quadrivalent - MDCK - PF 08/21/2019    Influenza - Quadrivalent - PF *Preferred* (6 months and older) 11/10/2016    MMR 04/13/1989, 08/22/2005    Poliovirus 04/13/1989, 01/02/1990, 08/01/1990, 10/26/1990, 08/09/1994    Td (ADULT) 08/22/2005    Tdap 07/26/2016       Patient Care Team:  AMMON Leung as PCP - General (Nurse Practitioner)     Subjective:     Constitutional:  Denies chills. Denies fever. Denies night sweats. Denies weight loss.   Ophthalmology: Denies blurred vision. Denies dry eyes. Denies eye pain. Denies Itching and redness.   ENT: Denies oral ulcers. Denies epistaxis. Denies dry mouth. Denies swollen glands.   Endocrine: Admits diabetes. Denies thyroid Problems.   Respiratory: Denies cough. Denies shortness of breath. Denies shortness of breath with exertion. Denies hemoptysis.   Cardiovascular: Denies chest pain at rest. Denies chest pain with exertion. Denies palpitations.   "  Gastrointestinal: Denies abdominal pain. Denies diarrhea. Denies nausea. Denies vomiting. Denies hematemesis or hematochezia. Denies heartburn.  Genitourinary: Denies blood in urine.  Musculoskeletal: See HPI for details  Integumentary: Denies rash. Denies photosensitivity.   Peripheral Vascular: Denies Ulcers of hands and/or feet. Denies Cold extremities.   Neurologic: Denies dizziness. Denies headache.  Denies loss of strength. Denies numbness or tingling.   Psychiatric: Denies depression. Denies anxiety. Denies suicidal/homicidal ideations.      Objective:     /66   Pulse 74   Temp 98.1 °F (36.7 °C)   Resp 16   Ht 5' 11" (1.803 m)   Wt 76.7 kg (169 lb)   SpO2 97%   BMI 23.57 kg/m²     Physical Exam    General Appearance: alert, pleasant, in no acute distress.  Skin: Skin color, texture, turgor normal. No rashes or lesions.  Eyes:  extraocular movement intact (EOMI), pupils equal, round, reactive to light and accommodation, conjunctiva clear.  ENT: No oral or nasal ulcers.  Neck:  Neck supple. No adenopathy.   Lungs: CTA throughout without crackles, rhonchi, or wheezes.   Heart: RRR w/o murmurs.  No edema. 2+ DP pulse.  Abdomen: Soft, non-tender, no masses, rebound or guarding.  Neuro: Alert, oriented, CN II-XII GI, sensory and motor innervation intact.  Musculoskeletal:  No synovitis on exam today.  No swelling or redness or warmth of any joints today.  Psych: Alert, oriented, normal eye contact.    Labs:     Lab Results   Component Value Date    WBC 5.8 08/10/2022    HGB 15.2 08/10/2022    HGB 15.4 08/10/2022    HCT 46.3 08/10/2022     08/10/2022    ALT 28 08/10/2022    AST 24 08/10/2022    BUN 15.4 08/10/2022    CREATININE 0.83 08/10/2022     08/10/2022    K 3.9 08/10/2022     01/05/2019    CO2 26 08/10/2022    CRP 1.40 08/10/2022    SEDRATE 8 08/10/2022      8/10/22: RF and anti CCP negative. LUIS negative. CBC, CMP ok. ESR and CRP normal. HIV, Hep B and C negative. No protein " and blood in urine.     Imaging:     CT head: 5/2022: No acute findings.     Assessment:       ICD-10-CM ICD-9-CM   1. Family history of systemic lupus erythematosus  Z82.69 V19.4   2. Type 1 diabetes mellitus without complication  E10.9 250.01   3. Rash of face  R21 782.1   4. Dizziness  R42 780.4   5. Arthralgia, unspecified joint  M25.50 719.40        Plan:     1. Family history of systemic lupus erythematosus:  LUIS negative in 8/2023.  Except for rash currently does not have any other signs or symptoms of lupus.  Saw pictures of rash in his chart.  Diffuse erythema of face in August 2022, saw picture in media taken on 8/10/22, etiology unclear.  Short exam today.  - Can not come to conclusion with out any finding on exam and negative lab work.  - he gets rash in summertime, we will follow-up with him in summer to further evaluate.     2. Type 1 diabetes mellitus without complication: follow up with PCP.     3. Rash of face: see above.  - sent referral to derm.     4. Dizziness:  He had seen ENT and was referred for audiology evaluation.  Never had audiological testing.  Advised to follow-up with ENT.     5. Generalized arthralgias, no synovitis on exam.  Rheumatoid factor and anti CCP negative.  Advised to do stretching and exercises and discussed symptomatic management of joint pain.          Follow up in about 5 months (around 9/4/2023). In addition to their scheduled follow up, the patient has also been instructed to follow up on as needed basis.        Total time spent with patient and documentation is more than 60 minutes. All questions were answered to patient's satisfaction and patient verbalized understanding.

## 2023-06-19 ENCOUNTER — PATIENT MESSAGE (OUTPATIENT)
Dept: ADMINISTRATIVE | Facility: HOSPITAL | Age: 38
End: 2023-06-19
Payer: MEDICAID

## 2023-08-21 ENCOUNTER — OFFICE VISIT (OUTPATIENT)
Dept: INTERNAL MEDICINE | Facility: CLINIC | Age: 38
End: 2023-08-21
Payer: MEDICAID

## 2023-08-21 ENCOUNTER — CLINICAL SUPPORT (OUTPATIENT)
Dept: INTERNAL MEDICINE | Facility: CLINIC | Age: 38
End: 2023-08-21
Attending: NURSE PRACTITIONER
Payer: MEDICAID

## 2023-08-21 VITALS
WEIGHT: 170 LBS | DIASTOLIC BLOOD PRESSURE: 73 MMHG | RESPIRATION RATE: 16 BRPM | SYSTOLIC BLOOD PRESSURE: 113 MMHG | TEMPERATURE: 99 F | HEART RATE: 78 BPM | HEIGHT: 71 IN | BODY MASS INDEX: 23.8 KG/M2

## 2023-08-21 DIAGNOSIS — Z00.00 WELLNESS EXAMINATION: Primary | ICD-10-CM

## 2023-08-21 DIAGNOSIS — E10.9 TYPE 1 DIABETES MELLITUS WITHOUT COMPLICATION: ICD-10-CM

## 2023-08-21 LAB
LEFT EYE DM RETINOPATHY: NEGATIVE
RIGHT EYE DM RETINOPATHY: NEGATIVE

## 2023-08-21 PROCEDURE — 92228 IMG RTA DETC/MNTR DS PHY/QHP: CPT | Mod: PBBFAC

## 2023-08-21 PROCEDURE — 3074F SYST BP LT 130 MM HG: CPT | Mod: CPTII,,, | Performed by: NURSE PRACTITIONER

## 2023-08-21 PROCEDURE — 99213 OFFICE O/P EST LOW 20 MIN: CPT | Mod: PBBFAC | Performed by: NURSE PRACTITIONER

## 2023-08-21 PROCEDURE — 99395 PREV VISIT EST AGE 18-39: CPT | Mod: S$PBB,,, | Performed by: NURSE PRACTITIONER

## 2023-08-21 PROCEDURE — 3074F PR MOST RECENT SYSTOLIC BLOOD PRESSURE < 130 MM HG: ICD-10-PCS | Mod: CPTII,,, | Performed by: NURSE PRACTITIONER

## 2023-08-21 PROCEDURE — 1160F PR REVIEW ALL MEDS BY PRESCRIBER/CLIN PHARMACIST DOCUMENTED: ICD-10-PCS | Mod: CPTII,,, | Performed by: NURSE PRACTITIONER

## 2023-08-21 PROCEDURE — 3008F BODY MASS INDEX DOCD: CPT | Mod: CPTII,,, | Performed by: NURSE PRACTITIONER

## 2023-08-21 PROCEDURE — 1159F MED LIST DOCD IN RCRD: CPT | Mod: CPTII,,, | Performed by: NURSE PRACTITIONER

## 2023-08-21 PROCEDURE — 3078F PR MOST RECENT DIASTOLIC BLOOD PRESSURE < 80 MM HG: ICD-10-PCS | Mod: CPTII,,, | Performed by: NURSE PRACTITIONER

## 2023-08-21 PROCEDURE — 3008F PR BODY MASS INDEX (BMI) DOCUMENTED: ICD-10-PCS | Mod: CPTII,,, | Performed by: NURSE PRACTITIONER

## 2023-08-21 PROCEDURE — 3078F DIAST BP <80 MM HG: CPT | Mod: CPTII,,, | Performed by: NURSE PRACTITIONER

## 2023-08-21 PROCEDURE — 99395 PR PREVENTIVE VISIT,EST,18-39: ICD-10-PCS | Mod: S$PBB,,, | Performed by: NURSE PRACTITIONER

## 2023-08-21 PROCEDURE — 1159F PR MEDICATION LIST DOCUMENTED IN MEDICAL RECORD: ICD-10-PCS | Mod: CPTII,,, | Performed by: NURSE PRACTITIONER

## 2023-08-21 PROCEDURE — 1160F RVW MEDS BY RX/DR IN RCRD: CPT | Mod: CPTII,,, | Performed by: NURSE PRACTITIONER

## 2023-08-21 NOTE — PROGRESS NOTES
AMMON Leung   [facility]  [address] [address]      PATIENT NAME: Kwabena Gallagher IV  : 1985  DATE: 23  MRN: [id number]      Patient PCP Information       Provider PCP Type    AMMON Leung General            Reason for Visit / Chief Complaint: Health Maintenance and Diabetes       History of Present Illness / Problem Focused Workflow     Kwabena Gallagher IV presents to the clinic with Health Maintenance and Diabetes     38 yo male here today via virtual audio. PMH T1DM, tinnitus. Followed by [facility], and [facility].     Osteoporosis Screening - 23 Vitamin D level  Diabetic Screening - 23 Foot / Fundal Exam     2023  Pt here today for yearly wellness OV, no labs completed at this time, pt reports she completed labs with [facility] in [place], [facility] Pt has appt on Friday with [facility] to eval face rash, was seen by Rheum clinic and will see them again in September for f/u. Denies any acute issues today. Agreeable to Foot and Fundal exam. Denies any acute issues today.   Denies chest pain, shortness of breath, cough, fever, headache, dizziness, weakness, abdominal pain, nausea, vomiting, diarrhea, constipation, black/bloody stools, unplanned weight loss, night sweats, changes in urinary patterns, burning/odor with urination, depression, anxiety, and SI/HI.             Review of Systems     Review of Systems   Constitutional: Negative.    HENT: Negative.     Eyes: Negative.    Respiratory: Negative.     Cardiovascular: Negative.    Gastrointestinal: Negative.    Endocrine: Negative.    Genitourinary: Negative.    Neurological: Negative.    Psychiatric/Behavioral: Negative.           Medications and Allergies     Medications  Current Outpatient Medications   Medication Instructions    insulin lispro 50 Units,  "Subcutaneous    INSULIN PUMP CARTRIDGE SUBQ Subcutaneous    ONETOUCH ULTRA TEST Strp No dose, route, or frequency recorded.         Allergies  Review of patient's allergies indicates:   Allergen Reactions    Bactrim [sulfamethoxazole-trimethoprim] Rash    Mupirocin Rash       Physical Examination     Visit Vitals  /73   Pulse 78   Temp 98.5 °F (36.9 °C)   Resp 16   Ht 5' 11" (1.803 m)   Wt 77.1 kg (170 lb)   BMI 23.71 kg/m²       Physical Exam  Constitutional:       Appearance: Normal appearance.   Cardiovascular:      Rate and Rhythm: Normal rate and regular rhythm.      Pulses:           Dorsalis pedis pulses are 2+ on the right side and 2+ on the left side.        Posterior tibial pulses are 2+ on the right side and 2+ on the left side.   Pulmonary:      Effort: Pulmonary effort is normal.      Breath sounds: Normal breath sounds.   Musculoskeletal:         General: Normal range of motion.      Cervical back: Normal range of motion.   Feet:      Right foot:      Protective Sensation: 9 sites tested.  9 sites sensed.      Skin integrity: Skin integrity normal.      Toenail Condition: Right toenails are normal.      Left foot:      Protective Sensation: 9 sites tested.  9 sites sensed.      Skin integrity: Skin integrity normal.      Toenail Condition: Left toenails are normal.   Skin:     General: Skin is warm and dry.   Neurological:      General: No focal deficit present.      Mental Status: He is alert and oriented to person, place, and time.   Psychiatric:         Mood and Affect: Mood normal.           Results       Assessment        ICD-10-CM ICD-9-CM   1. Wellness examination  Z00.00 V70.0   2. Type 1 diabetes mellitus without complication  E10.9 250.01        Plan      Problem List Items Addressed This Visit          Endocrine    Type 1 diabetes mellitus    Relevant Orders    Diabetic Eye Screening Photo (Completed)     Other Visit Diagnoses       Wellness examination    -  Primary    Relevant Orders "    TSH    Hemoglobin A1C    Vitamin D    Comprehensive Metabolic Panel    Urinalysis, Reflex to Urine Culture    Lipid Panel    CBC Auto Differential            Future Appointments   Date Time Provider Department Center   8/25/2023 10:00 AM Maria Luz Rashid MD [facility]   9/5/2023 11:30 AM Mireille Taylor MD [facility] [place] [facility]   9/4/2024  8:30 AM [facility]        Follow up in about 54 weeks (around 9/2/2024).      Signature:     [facility]  [address] [id number]    Date of encounter: 8/21/23

## 2023-08-22 NOTE — PROGRESS NOTES
Kwabena Gallagher IV is a 37 y.o. male here for a diabetic eye screening with non-dilated fundus photos per AMMON Medeiros.    Patient cooperative?: Yes  Small pupils?: No  Last eye exam: unknown    For exam results, see Encounter Report.

## 2023-08-25 ENCOUNTER — OFFICE VISIT (OUTPATIENT)
Dept: FAMILY MEDICINE | Facility: CLINIC | Age: 38
End: 2023-08-25
Payer: MEDICAID

## 2023-08-25 VITALS
OXYGEN SATURATION: 97 % | HEIGHT: 71 IN | WEIGHT: 170 LBS | TEMPERATURE: 98 F | BODY MASS INDEX: 23.8 KG/M2 | DIASTOLIC BLOOD PRESSURE: 75 MMHG | RESPIRATION RATE: 18 BRPM | HEART RATE: 80 BPM | SYSTOLIC BLOOD PRESSURE: 120 MMHG

## 2023-08-25 DIAGNOSIS — L21.9 SEBORRHEIC DERMATITIS: Primary | ICD-10-CM

## 2023-08-25 DIAGNOSIS — R21 RASH OF FACE: ICD-10-CM

## 2023-08-25 PROCEDURE — 99213 OFFICE O/P EST LOW 20 MIN: CPT | Mod: PBBFAC | Performed by: FAMILY MEDICINE

## 2023-08-25 RX ORDER — KETOCONAZOLE 20 MG/ML
SHAMPOO, SUSPENSION TOPICAL
Qty: 120 ML | Refills: 2 | Status: SHIPPED | OUTPATIENT
Start: 2023-08-28

## 2023-08-25 RX ORDER — TACROLIMUS 1 MG/G
OINTMENT TOPICAL 2 TIMES DAILY
Qty: 30 G | Refills: 2 | Status: SHIPPED | OUTPATIENT
Start: 2023-08-25

## 2023-08-25 RX ORDER — KETOCONAZOLE 20 MG/G
CREAM TOPICAL DAILY
Qty: 30 G | Refills: 2 | Status: SHIPPED | OUTPATIENT
Start: 2023-08-25

## 2023-08-25 NOTE — PROGRESS NOTES
Subjective:       Patient ID: Kwabena Gallagher IV is a 37 y.o. male.    Chief Complaint: Rash    HPI 36 yo male presents to derm screening clinic. Reporting facial rash. Worse in sun. Has family history of Lupus. Autoimmune work up negative. Followed by rheum. Does endorse dandruff in hair. Has PMH of Type 1 DM.    Review of Systems  Per HPI  Objective:      Physical Exam    Vitals:    08/25/23 1011   BP: 120/75   Pulse: 80   Resp: 18   Temp: 98.4 °F (36.9 °C)     Integumentary: Erythematous maculopapular rash on forehead and cheeks and nose. Mild scalp flaking.     Assessment:       1. Seborrheic dermatitis    2. Rash of face        Plan:       Will do a trial of protopic ointment.   Also add ketoconazole shampoo and cream  Limit sun exposure  Previous labs reviewed.   Has upcoming appt  RTC 2 months  If no improvement will set up with derm

## 2023-09-28 ENCOUNTER — OFFICE VISIT (OUTPATIENT)
Dept: RHEUMATOLOGY | Facility: CLINIC | Age: 38
End: 2023-09-28
Payer: MEDICAID

## 2023-09-28 VITALS
HEIGHT: 71 IN | DIASTOLIC BLOOD PRESSURE: 82 MMHG | HEART RATE: 82 BPM | WEIGHT: 168.63 LBS | BODY MASS INDEX: 23.61 KG/M2 | TEMPERATURE: 99 F | SYSTOLIC BLOOD PRESSURE: 126 MMHG | OXYGEN SATURATION: 95 % | RESPIRATION RATE: 20 BRPM

## 2023-09-28 DIAGNOSIS — R42 DIZZINESS: ICD-10-CM

## 2023-09-28 DIAGNOSIS — R21 RASH OF FACE: ICD-10-CM

## 2023-09-28 DIAGNOSIS — E10.9 TYPE 1 DIABETES MELLITUS WITHOUT COMPLICATION: ICD-10-CM

## 2023-09-28 DIAGNOSIS — Z82.69 FAMILY HISTORY OF SYSTEMIC LUPUS ERYTHEMATOSUS: Primary | ICD-10-CM

## 2023-09-28 DIAGNOSIS — M25.50 ARTHRALGIA, UNSPECIFIED JOINT: ICD-10-CM

## 2023-09-28 PROCEDURE — 3079F DIAST BP 80-89 MM HG: CPT | Mod: CPTII,,, | Performed by: INTERNAL MEDICINE

## 2023-09-28 PROCEDURE — 99214 PR OFFICE/OUTPT VISIT, EST, LEVL IV, 30-39 MIN: ICD-10-PCS | Mod: S$PBB,,, | Performed by: INTERNAL MEDICINE

## 2023-09-28 PROCEDURE — 3079F PR MOST RECENT DIASTOLIC BLOOD PRESSURE 80-89 MM HG: ICD-10-PCS | Mod: CPTII,,, | Performed by: INTERNAL MEDICINE

## 2023-09-28 PROCEDURE — 1159F PR MEDICATION LIST DOCUMENTED IN MEDICAL RECORD: ICD-10-PCS | Mod: CPTII,,, | Performed by: INTERNAL MEDICINE

## 2023-09-28 PROCEDURE — 3008F BODY MASS INDEX DOCD: CPT | Mod: CPTII,,, | Performed by: INTERNAL MEDICINE

## 2023-09-28 PROCEDURE — 3074F SYST BP LT 130 MM HG: CPT | Mod: CPTII,,, | Performed by: INTERNAL MEDICINE

## 2023-09-28 PROCEDURE — 1159F MED LIST DOCD IN RCRD: CPT | Mod: CPTII,,, | Performed by: INTERNAL MEDICINE

## 2023-09-28 PROCEDURE — 3008F PR BODY MASS INDEX (BMI) DOCUMENTED: ICD-10-PCS | Mod: CPTII,,, | Performed by: INTERNAL MEDICINE

## 2023-09-28 PROCEDURE — 99214 OFFICE O/P EST MOD 30 MIN: CPT | Mod: S$PBB,,, | Performed by: INTERNAL MEDICINE

## 2023-09-28 PROCEDURE — 99214 OFFICE O/P EST MOD 30 MIN: CPT | Mod: PBBFAC | Performed by: INTERNAL MEDICINE

## 2023-09-28 PROCEDURE — 3074F PR MOST RECENT SYSTOLIC BLOOD PRESSURE < 130 MM HG: ICD-10-PCS | Mod: CPTII,,, | Performed by: INTERNAL MEDICINE

## 2023-09-28 NOTE — PROGRESS NOTES
Patient ID: [id number]     Chief Complaint: Follow-up      HPI:     Kwabena Gallagher IV is a 37 y.o. male here today for follow up of rash and fatigue.    C/o generalized pain since he was diagnosed with DM and was admitted for DKA when he was 26 years old. Generalized pain is intermittent, no known aggravating factors. Pain in shoulders, elbows and back. Denies red, warm and swollen joints. Morning stiffness for 1-2 hours, all over body. He does not do exercise.  He has two kids under 2 years and stays active with them.   Sleep is poor, worse with little kids and admits fatigue.  He is in IT now, used to work in kitchen in the past.   He had a pimple on scalp, 3/10/23 popped it and developed staph infection, completed antibiotics, wound is healing well.   Admits intermittent vertigo, had seen ENT, work up was negative and he takes meclizine as needed.   Admits rash on face, worse in summer and when he takes hot shower. Can not tell how long it lasts or what are the triggers.     Denies history of fevers, photosensitivity, oral or nasal ulcers, h/o MI, stroke, seizures, h/o PE or DVT, Raynaud's phenomenon, uveitis, malignancies.   Family history of autoimmune disease: Mother has lupus.  Smoking: smokes marijuana. Stopped cigarettes in 2019.     Social History     Tobacco Use   Smoking Status Former    Current packs/day: 0.00    Average packs/day: 1 pack/day for 22.0 years (22.0 ttl pk-yrs)    Types: Cigarettes    Start date:     Quit date: 2019    Years since quittin.7   Smokeless Tobacco Former          ----------------------------  Diabetes mellitus type I  Vertigo     Past Surgical History:   Procedure Laterality Date    NO PAST SURGERIES         Review of patient's allergies indicates:   Allergen Reactions    Bactrim [sulfamethoxazole-trimethoprim] Rash    Mupirocin Rash       Outpatient Medications Marked as Taking for the 23 encounter (Office Visit) with Mireille Taylor MD   Medication Sig  Dispense Refill    insulin lispro 100 unit/mL injection Inject 50 Units into the skin.      INSULIN PUMP CARTRIDGE SUBQ Inject into the skin.      ketoconazole (NIZORAL) 2 % cream Apply topically once daily. Use before Protopic daily 30 g 2    ketoconazole (NIZORAL) 2 % shampoo Apply topically twice a week. 120 mL 2    ONETOUCH ULTRA TEST Strp       tacrolimus (PROTOPIC) 0.1 % ointment Apply topically 2 (two) times daily. 30 g 2       Social History     Socioeconomic History    Marital status: Single   Tobacco Use    Smoking status: Former     Current packs/day: 0.00     Average packs/day: 1 pack/day for 22.0 years (22.0 ttl pk-yrs)     Types: Cigarettes     Start date:      Quit date:      Years since quittin.7    Smokeless tobacco: Former   Substance and Sexual Activity    Alcohol use: Yes     Alcohol/week: 21.0 standard drinks of alcohol     Types: 21 Shots of liquor per week     Comment: takes shots at times    Drug use: Yes     Types: Marijuana    Sexual activity: Yes        Family History   Problem Relation Age of Onset    Lupus Mother     Rheum arthritis Mother     Lung cancer Father         Immunization History   Administered Date(s) Administered    COVID-19, MRNA, LN-S, PF (Pfizer) (Purple Cap) 2021, 2021    DTP 1989, 10/26/1989, 1990, 1990, 1994    HIB 10/26/1989    Influenza 2014    Influenza - Quadrivalent - MDCK - PF 2019    Influenza - Quadrivalent - PF *Preferred* (6 months and older) 11/10/2016    MMR 1989, 2005    Poliovirus 1989, 1990, 1990, 10/26/1990, 1994    Td (ADULT) 2005    Tdap 2016       Patient Care Team:  Elin Patiño FNP as PCP - General (Nurse Practitioner)     Subjective:     Constitutional:  Denies chills. Denies fever. Denies night sweats. Denies weight loss.   Ophthalmology: Denies blurred vision. Denies dry eyes. Denies eye pain. Denies Itching and redness.   ENT: Denies  "oral ulcers. Denies epistaxis. Denies dry mouth. Denies swollen glands.   Endocrine: Admits diabetes. Denies thyroid Problems.   Respiratory: Denies cough. Denies shortness of breath. Denies shortness of breath with exertion. Denies hemoptysis.   Cardiovascular: Denies chest pain at rest. Denies chest pain with exertion. Denies palpitations.    Gastrointestinal: Denies abdominal pain. Denies diarrhea. Denies nausea. Denies vomiting. Denies hematemesis or hematochezia. Denies heartburn.  Genitourinary: Denies blood in urine.  Musculoskeletal: See HPI for details  Integumentary: Denies rash. Denies photosensitivity.   Peripheral Vascular: Denies Ulcers of hands and/or feet. Denies Cold extremities.   Neurologic: Denies dizziness. Denies headache.  Denies loss of strength. Denies numbness or tingling.   Psychiatric: Denies depression. Denies anxiety. Denies suicidal/homicidal ideations.      Objective:     /82 (BP Location: Left arm, Patient Position: Sitting, BP Method: Small (Automatic))   Pulse 82   Temp 98.9 °F (37.2 °C) (Oral)   Resp 20   Ht 5' 11" (1.803 m)   Wt 76.5 kg (168 lb 9.6 oz)   SpO2 95%   BMI 23.51 kg/m²     Physical Exam    General Appearance: alert, pleasant, in no acute distress.  Skin: Skin color, texture, turgor normal. Mild erythema of center of care, on cheeks, nose and forehead including nasolabial folds.   Eyes:  extraocular movement intact (EOMI), pupils equal, round, reactive to light and accommodation, conjunctiva clear.  ENT: No oral or nasal ulcers.  Neck:  Neck supple. No adenopathy.   Lungs: CTA throughout without crackles, rhonchi, or wheezes.   Heart: RRR w/o murmurs.  No edema. 2+ DP pulse.  Abdomen: Soft, non-tender, no masses, rebound or guarding.  Neuro: Alert, oriented, CN II-XII GI, sensory and motor innervation intact.  Musculoskeletal:  No synovitis on exam today.  No swelling or redness or warmth of any joints today.  Psych: Alert, oriented, normal eye " contact.    Labs:      8/10/22: RF and anti CCP negative. LUIS negative. CBC, CMP ok. ESR and CRP normal. HIV, Hep B and C negative. No protein and blood in urine.     Imaging:     CT head: 5/2022: No acute findings.     Assessment:       ICD-10-CM ICD-9-CM   1. Family history of systemic lupus erythematosus  Z82.69 V19.4   2. Type 1 diabetes mellitus without complication  E10.9 250.01   3. Rash of face  R21 782.1   4. Dizziness  R42 780.4   5. Arthralgia, unspecified joint  M25.50 719.40          Plan:     1. Family history of systemic lupus erythematosus:  LUIS negative in 8/2023.  Except for rash. Fatigue and arthralgia currently does not have any other signs or symptoms of lupus.  Saw pictures of rash in his chart.  Diffuse erythema of face in August 2022, saw picture in media taken on 8/10/22, etiology unclear.    - Mild erythema of center of face. Will repeat LUIS, check myositis panel, CPK and aldolase.   - I will call with test results and see him back if any of results comes back as abnormal. If all tests are normal advised him to follow up with his PCP and derm for rash.      2. Type 1 diabetes mellitus without complication: follow up with PCP.     3. Rash of face: see above.  - sent referral to derm in the past.     4. Dizziness:  He had seen ENT and was referred for audiology evaluation.  Never had audiological testing.  Advised to follow-up with ENT.     5. Generalized arthralgias, no synovitis on exam.  Rheumatoid factor and anti CCP negative.  Advised to do stretching and exercises and discussed symptomatic management of joint pain. He does not have inflammatory arthritis.           Follow up if symptoms worsen or fail to improve. In addition to their scheduled follow up, the patient has also been instructed to follow up on as needed basis.        Total time spent with patient and documentation is more than 30 minutes. All questions were answered to patient's satisfaction and patient verbalized  understanding.

## 2023-10-15 ENCOUNTER — TELEPHONE (OUTPATIENT)
Dept: RHEUMATOLOGY | Facility: CLINIC | Age: 38
End: 2023-10-15
Payer: MEDICAID

## 2023-10-16 NOTE — TELEPHONE ENCOUNTER
Please let him know test results for lupus came back negative again, muscle enzymes are normal.  Testing for myositis also came back negative.  He currently does not have active autoimmune disease.  He can follow-up with Dermatology and with his PCP.

## 2024-10-23 ENCOUNTER — TELEPHONE (OUTPATIENT)
Dept: INTERNAL MEDICINE | Facility: CLINIC | Age: 39
End: 2024-10-23

## 2024-10-23 NOTE — TELEPHONE ENCOUNTER
I attempted to conatact patient.   He needs f/u appt. To satisfy A1C metric. Cancelled appt. X 2 in 9/2024  NO v/m

## 2024-10-24 ENCOUNTER — TELEPHONE (OUTPATIENT)
Dept: INTERNAL MEDICINE | Facility: CLINIC | Age: 39
End: 2024-10-24

## 2024-10-24 NOTE — TELEPHONE ENCOUNTER
----- Message from Sole sent at 10/24/2024  9:23 AM CDT -----  Who Called: Kwabena Gallagher IV    Caller is requesting assistance/information from provider's office.    Symptoms (please be specific):    How long has patient had these symptoms:    List of preferred pharmacies on file (remove unneeded): [unfilled]  If different, enter pharmacy into here including location and phone number:       Patient's Preferred Phone Number on File: [phone]   Best Call Back Number, if different:  Additional Information: pt receive call yesterday to sched follow up. Pt declined, pt has a new provider.